# Patient Record
Sex: MALE | Race: WHITE
[De-identification: names, ages, dates, MRNs, and addresses within clinical notes are randomized per-mention and may not be internally consistent; named-entity substitution may affect disease eponyms.]

---

## 2018-01-11 ENCOUNTER — HOSPITAL ENCOUNTER (OUTPATIENT)
Dept: HOSPITAL 92 - LABBT | Age: 83
Discharge: HOME | End: 2018-01-11
Attending: THORACIC SURGERY (CARDIOTHORACIC VASCULAR SURGERY)
Payer: MEDICARE

## 2018-01-11 DIAGNOSIS — Z01.812: Primary | ICD-10-CM

## 2018-01-11 DIAGNOSIS — K55.059: ICD-10-CM

## 2018-01-11 DIAGNOSIS — Z88.2: ICD-10-CM

## 2018-01-11 DIAGNOSIS — Z88.0: ICD-10-CM

## 2018-01-11 LAB
ANION GAP SERPL CALC-SCNC: 9 MMOL/L (ref 10–20)
BUN SERPL-MCNC: 15 MG/DL (ref 8.4–25.7)
CALCIUM SERPL-MCNC: 9.7 MG/DL (ref 7.8–10.44)
CHLORIDE SERPL-SCNC: 104 MMOL/L (ref 98–107)
CO2 SERPL-SCNC: 32 MMOL/L (ref 23–31)
CREAT CL PREDICTED SERPL C-G-VRATE: 0 ML/MIN (ref 70–130)
GLUCOSE SERPL-MCNC: 105 MG/DL (ref 83–110)
HGB BLD-MCNC: 9.9 G/DL (ref 14–18)
MCH RBC QN AUTO: 35.2 PG (ref 27–31)
MCV RBC AUTO: 110 FL (ref 80–94)
PLATELET # BLD AUTO: 274 THOU/UL (ref 130–400)
POTASSIUM SERPL-SCNC: 4.5 MMOL/L (ref 3.5–5.1)
RBC # BLD AUTO: 2.82 MILL/UL (ref 4.7–6.1)
SODIUM SERPL-SCNC: 140 MMOL/L (ref 136–145)
WBC # BLD AUTO: 13.1 THOU/UL (ref 4.8–10.8)

## 2018-01-11 PROCEDURE — 85027 COMPLETE CBC AUTOMATED: CPT

## 2018-01-11 PROCEDURE — 80048 BASIC METABOLIC PNL TOTAL CA: CPT

## 2018-01-15 ENCOUNTER — HOSPITAL ENCOUNTER (OUTPATIENT)
Dept: HOSPITAL 92 - CCL | Age: 83
Discharge: HOME | End: 2018-01-15
Attending: THORACIC SURGERY (CARDIOTHORACIC VASCULAR SURGERY)
Payer: MEDICARE

## 2018-01-15 VITALS — BODY MASS INDEX: 22.1 KG/M2

## 2018-01-15 DIAGNOSIS — Z88.1: ICD-10-CM

## 2018-01-15 DIAGNOSIS — Z82.49: ICD-10-CM

## 2018-01-15 DIAGNOSIS — Z88.2: ICD-10-CM

## 2018-01-15 DIAGNOSIS — Z88.5: ICD-10-CM

## 2018-01-15 DIAGNOSIS — I10: ICD-10-CM

## 2018-01-15 DIAGNOSIS — I73.89: Primary | ICD-10-CM

## 2018-01-15 DIAGNOSIS — Z85.46: ICD-10-CM

## 2018-01-15 DIAGNOSIS — Z87.891: ICD-10-CM

## 2018-01-15 DIAGNOSIS — Z90.2: ICD-10-CM

## 2018-01-15 DIAGNOSIS — K55.1: ICD-10-CM

## 2018-01-15 DIAGNOSIS — Z88.0: ICD-10-CM

## 2018-01-15 PROCEDURE — 99153 MOD SED SAME PHYS/QHP EA: CPT

## 2018-01-15 PROCEDURE — C1769 GUIDE WIRE: HCPCS

## 2018-01-15 PROCEDURE — C1887 CATHETER, GUIDING: HCPCS

## 2018-01-15 PROCEDURE — 76942 ECHO GUIDE FOR BIOPSY: CPT

## 2018-01-15 PROCEDURE — B3001ZZ PLAIN RADIOGRAPHY OF THORACIC AORTA USING LOW OSMOLAR CONTRAST: ICD-10-PCS | Performed by: THORACIC SURGERY (CARDIOTHORACIC VASCULAR SURGERY)

## 2018-01-15 PROCEDURE — 99152 MOD SED SAME PHYS/QHP 5/>YRS: CPT

## 2018-01-15 PROCEDURE — 75625 CONTRAST EXAM ABDOMINL AORTA: CPT

## 2018-01-15 PROCEDURE — 36215 PLACE CATHETER IN ARTERY: CPT

## 2018-01-15 NOTE — OP
DATE OF PROCEDURE:  01/15/2018

 

PREOPERATIVE DIAGNOSIS:  Mesenteric ischemia - chronic.

 

POSTOPERATIVE DIAGNOSIS:  Mesenteric ischemia - chronic.

 

PROCEDURE PERFORMED:  Brachial approach to abdominal aortogram.

 

TOTAL CONTRAST:  30 mL

 

TOTAL FLUORO TIME:  16.3 minutes.

 

DESCRIPTION OF PROCEDURE:  After consent was obtained, the patient was brought to the cath lab and pl
aced in supine position on the cath lab table.  Left arm was prepped and draped in usual sterile fash
ion.  Arm was anesthetized with 1% lidocaine.  Using ultrasound guidance, the left brachial artery wa
s accessed with a micropuncture kit.  The 4-Bangladeshi micropuncture sheath was exchanged for a 5-Bangladeshi 
sheath.  Bentson wire and an angled glide catheter were then passed through the brachial and axillary
 artery into the thoracic aorta.  The catheter and wire preferentially traveled into the ascending ao
rta.  This was then exchanged for a Contra catheter and the Contra catheter were used to guide the Gl
idewire around the arch of the aorta into the descending thoracic aorta.  Multiple catheters and guid
ewires were then used to eventually traverse the thoracic aorta down into the abdominal aorta.  The l
ateral projection into the abdominal aorta was performed.  Celiac artery was noted to be patent, but 
highly stenotic and highly calcified at the origin from the aorta.  The SMA and REBEKAH were never visual
ized.  Multiple guidewires and catheters were then used to try to access the celiac artery.  I was ne
teja successful in accessing the orifice of the celiac artery.  Eventually, we abandoned all approache
s and catheters, guidewires and sheaths were removed and manual pressure held for hemostasis.  The pa
tient tolerated the procedure well.

## 2018-06-22 ENCOUNTER — HOSPITAL ENCOUNTER (OUTPATIENT)
Dept: HOSPITAL 92 - CT | Age: 83
Discharge: HOME | End: 2018-06-22
Attending: THORACIC SURGERY (CARDIOTHORACIC VASCULAR SURGERY)
Payer: MEDICARE

## 2018-06-22 DIAGNOSIS — J98.4: ICD-10-CM

## 2018-06-22 DIAGNOSIS — J43.9: ICD-10-CM

## 2018-06-22 DIAGNOSIS — I65.23: Primary | ICD-10-CM

## 2018-06-22 LAB — ESTIMATED GFR-MDRD - POC: (no result)

## 2018-06-22 PROCEDURE — 70498 CT ANGIOGRAPHY NECK: CPT

## 2018-06-22 PROCEDURE — 82565 ASSAY OF CREATININE: CPT

## 2018-06-22 NOTE — CT
CTA OF THE NECK UTILIZING iv CONTRAST AND 3d REFORMATTED IMAGING:

 

INDICATION: 

Symptomatic stenosis f the left carotid.

 

COMPARISON: 

CTA of the thorax dated 12/27/16.

 

FINDINGS: 

There is moderate to high-grade stenosis involving the origin and proximal aspect of the left common 
carotid artery.  There is a focus of moderate 50% stenosis involving the left mid common carotid erika
ry on image 108 of series 2.  There is a more focal area of severe stenosis involving the distal comm
on carotid artery image 123 of series 2.  This is involving approximately 90% of the luminal caliber 
of the artery.  There is moderate 50% lumen caliber involving the left carotid bulb with severe narro
wing of the origin of the left external carotid artery.  There is moderate 50% luminal caliber narrow
ing involving the proximal left internal carotid artery.  The remaining cervical course and visualize
d intracranial course of the left ICA appear within normal limits.  

 

The origin of the right common carotid artery from the right brachiocephalic artery appears patent.  
The right brachiocephalic artery origin and course are patent.  The right subclavian artery course ap
pears patent.

 

There is prominent calcification involving the right carotid bulb.  There is mild narrowing involving
 the origin of the right external carotid artery.  There is severe narrowing involving the proximal r
ight ICA causing 80% luminal caliber narrowing best seen on image 167 of series 2.  The remaining  ce
rvical course and visualized intracranial course appear patent.

 

There is severe narrowing along the origin of the right vertebral artery.  There is severe narrowing 
involving the origin of the left vertebral artery.  

 

There is moderate to severe narrowing involving the origin of the left subclavian artery from the aor
tic arch.  

 

There is pleural parenchymal scarring involving both lung apices with prominent emphysema.  There is 
a saber-sheath trachea.  There is slight increased prominence of the right azygous vein which may be 
related to some cardiac dysfunction.  No pathologically enlarged lymph nodes are seen within the neck
.  The visualized aerodigestive tract appears within normal limits.  No acute osseous abnormality kirstie
dent.

 

IMPRESSION: 

1.  High-grade stenosis involving the origin of the left common carotid artery and distal aspect of t
he left common carotid artery.  There is moderate narrowing involving the mid left common carotid art
marcio and left carotid bulb.  There is mild to moderate narrowing involving the proximal left internal 
carotid artery.

 

2.  High-grade stenosis involving the proximal right internal carotid artery with 80% luminal caliber
 narrowing.  There is high-grade stenosis involving the origins of both vertebral arteries.

 

3.  High-grade stenosis involving the origin of the left external carotid artery.

 

4.  Moderate to high-grade stenosis involving the origin of the left subclavian artery from the aorti
c arch.

 

5.  Emphysema and pleural parenchymal scarring.

 

POS: [facility]

## 2018-07-06 ENCOUNTER — HOSPITAL ENCOUNTER (INPATIENT)
Dept: HOSPITAL 92 - SURG A | Age: 83
LOS: 1 days | Discharge: HOME | DRG: 38 | End: 2018-07-07
Attending: THORACIC SURGERY (CARDIOTHORACIC VASCULAR SURGERY) | Admitting: THORACIC SURGERY (CARDIOTHORACIC VASCULAR SURGERY)
Payer: MEDICARE

## 2018-07-06 VITALS — SYSTOLIC BLOOD PRESSURE: 92 MMHG | DIASTOLIC BLOOD PRESSURE: 42 MMHG

## 2018-07-06 VITALS — BODY MASS INDEX: 23.8 KG/M2

## 2018-07-06 DIAGNOSIS — Z88.5: ICD-10-CM

## 2018-07-06 DIAGNOSIS — I10: ICD-10-CM

## 2018-07-06 DIAGNOSIS — Z85.46: ICD-10-CM

## 2018-07-06 DIAGNOSIS — Z86.69: ICD-10-CM

## 2018-07-06 DIAGNOSIS — J47.9: ICD-10-CM

## 2018-07-06 DIAGNOSIS — I65.22: Primary | ICD-10-CM

## 2018-07-06 DIAGNOSIS — Z88.0: ICD-10-CM

## 2018-07-06 DIAGNOSIS — I47.1: ICD-10-CM

## 2018-07-06 DIAGNOSIS — I73.89: ICD-10-CM

## 2018-07-06 DIAGNOSIS — Z90.2: ICD-10-CM

## 2018-07-06 DIAGNOSIS — Z86.718: ICD-10-CM

## 2018-07-06 DIAGNOSIS — Z87.891: ICD-10-CM

## 2018-07-06 DIAGNOSIS — Z88.1: ICD-10-CM

## 2018-07-06 LAB
ANION GAP SERPL CALC-SCNC: 11 MMOL/L (ref 10–20)
BUN SERPL-MCNC: 11 MG/DL (ref 8.4–25.7)
CALCIUM SERPL-MCNC: 9.4 MG/DL (ref 7.8–10.44)
CHLORIDE SERPL-SCNC: 99 MMOL/L (ref 98–107)
CO2 SERPL-SCNC: 33 MMOL/L (ref 23–31)
CREAT CL PREDICTED SERPL C-G-VRATE: 67 ML/MIN (ref 70–130)
GLUCOSE SERPL-MCNC: 98 MG/DL (ref 83–110)
HGB BLD-MCNC: 12.2 G/DL (ref 14–18)
MACROCYTES BLD QL SMEAR: (no result) (100X)
MCH RBC QN AUTO: 35.5 PG (ref 27–31)
MCV RBC AUTO: 109 FL (ref 78–98)
MDIFF COMPLETE?: YES
PLATELET # BLD AUTO: 197 THOU/UL (ref 130–400)
PLATELET BLD QL SMEAR: (no result)
POTASSIUM SERPL-SCNC: 4.1 MMOL/L (ref 3.5–5.1)
RBC # BLD AUTO: 3.43 MILL/UL (ref 4.7–6.1)
SODIUM SERPL-SCNC: 139 MMOL/L (ref 136–145)
WBC # BLD AUTO: 6.3 THOU/UL (ref 4.8–10.8)

## 2018-07-06 PROCEDURE — 85025 COMPLETE CBC W/AUTO DIFF WBC: CPT

## 2018-07-06 PROCEDURE — 94640 AIRWAY INHALATION TREATMENT: CPT

## 2018-07-06 PROCEDURE — 03CN0ZZ EXTIRPATION OF MATTER FROM LEFT EXTERNAL CAROTID ARTERY, OPEN APPROACH: ICD-10-PCS | Performed by: THORACIC SURGERY (CARDIOTHORACIC VASCULAR SURGERY)

## 2018-07-06 PROCEDURE — 94664 DEMO&/EVAL PT USE INHALER: CPT

## 2018-07-06 PROCEDURE — 36415 COLL VENOUS BLD VENIPUNCTURE: CPT

## 2018-07-06 PROCEDURE — 80048 BASIC METABOLIC PNL TOTAL CA: CPT

## 2018-07-06 RX ADMIN — MOMETASONE FUROATE AND FORMOTEROL FUMARATE DIHYDRATE SCH PUFF: 200; 5 AEROSOL RESPIRATORY (INHALATION) at 19:12

## 2018-07-06 NOTE — PRG
DATE OF SERVICE:  07/06/2018

 

This morning, status post carotid surgery.  He is doing well.  

 

PHYSICAL EXAMINATION: 

VITAL SIGNS:  Sats are 90% on supplemental oxygen, pulse 80, blood pressure 138/80, respirations 18. 
 He denies any difficulty breathing.

CHEST:  Chest reveals bilateral rhonchi and crackles.

CARDIAC:  Normal S1, S2.  No gallops.

ABDOMEN:  Soft, no masses.

 

IMPRESSION:

1.  Chronic bronchitis.  

2.  Bronchiectasis.

3.  Supraventricular tachycardia.  

4.  Carotid surgery.

 

PLAN:  Continue home medication.  If he is stable tomorrow he can be discharged home.  Follow up in t
he office.

## 2018-07-06 NOTE — OP
DATE OF PROCEDURE:  07/06/2018

 

PREOPERATIVE DIAGNOSIS:  Symptomatic left carotid stenosis.

 

POSTOPERATIVE DIAGNOSIS:  Symptomatic left carotid stenosis.

 

PROCEDURE:  Extended left carotid endarterectomy near the clavicle and extending all the way up throu
gh the bulb approximately 2 cm distal to the carotid bifurcation.  Note, a patch angioplasty was not 
performed due to the patient's chronic bronchiectasis and infection problems.  The patient was not ab
le to be shunted due to the length of the endarterectomy.

 

SURGEON:  Francisco Guerrero M.D.

 

ANESTHESIA:  General endotracheal.

 

ESTIMATED BLOOD LOSS:  Less than 100 mL.

 

PROCEDURE IN DETAIL:  After consent was obtained, the patient was brought to operating room and place
d supine on the operating room table.  Appropriate anesthetic monitor was placed and general endotrac
heal anesthesia induced.  Head was rotated to the right.  Left neck was prepped and draped in usual s
terile fashion.  Joints were appropriately supported and padded.

 

Skin incision was made along the anterior border of sternocleidomastoid.  This was later extended tila
nward towards the clavicle.  Dissection through the platysma was obtained with cautery.  Sternocleido
mastoid was mobilized.  Jugular vein was mobilized.  Facial vein branches _____  clips and ties.  Car
otid sheath was entered and the common external and internal carotid arteries were carefully exposed.
  A 5000 units of heparin was given.  A soft spot in the common carotid artery was able to be located
 very proximally.  The distal internal carotid artery was also soft.  The remainder of the carotid wa
s essentially calcified.  After 3 minutes, internal, common, and external carotid arteries were seria
lly clamped.  The incision was made on the common carotid artery extended through the near occlusion 
of the bulb and up into the internal carotid artery distal to plaque.  Endarterectomy was then perfor
med using good hemostats.  A good tapered distal endpoint was obtained.  Eversion endarterectomy of t
he external carotid artery was performed.  Medial fibers were debrided.  Artery was flushed with hepa
rinized saline.  The artery was closed with running 6-0 Prolene suture.  Prior to completion of the s
uture line, arteries were backbled and flushed with heparinized saline.  Suture line was completed an
d tied.  A 25 mg of protamine was administered.  Hemostasis was ensured.  Wounds were copiously irrig
ated, closed in layers.  Dermabond applied to the skin.  The patient was awakened and neurologically 
intact in the operating room.  The patient was then transferred to the intensive care unit in stable 
condition.  Needle, sponge, and instrument counts were all reported correct at the end of the procedu
re.

## 2018-07-07 VITALS — TEMPERATURE: 100 F

## 2018-07-07 RX ADMIN — MOMETASONE FUROATE AND FORMOTEROL FUMARATE DIHYDRATE SCH: 200; 5 AEROSOL RESPIRATORY (INHALATION) at 10:19

## 2018-07-07 RX ADMIN — MOMETASONE FUROATE AND FORMOTEROL FUMARATE DIHYDRATE SCH PUFF: 200; 5 AEROSOL RESPIRATORY (INHALATION) at 11:37

## 2018-07-07 NOTE — PRG
DATE OF SERVICE:  07/07/2018

 

SUBJECTIVE:  Mr. Jimenez is in no distress.  He has no complaints.  He feels like he is ready to go home
.  He is on nitroglycerin drip.  His blood pressure is 176/85.  At this time, he is getting his morni
ng dose of Cardizem 120 mg.

 

Overnight, his blood pressure has been reasonably controlled in 160s although _____ after surgery to 
be better than yesterday afternoon, he was in 130s-140s.  Suspect once he starts absorbing his Cardiz
em, his blood pressure will be better and will be off the nitroglycerin.

 

PHYSICAL EXAMINATION:

LUNGS:  Remarkable for crackles at both lung bases.

CARDIOVASCULAR:  Regular rhythm.

ABDOMEN:  Soft and nontender.

EXTREMITIES:  Without asymmetry.  He has no focal neurological complaints or deficits.

 

LABORATORY DATA:  White count 6.3 yesterday, hemoglobin 12.2.  Electrolytes were normal yesterday.

 

IMPRESSION:

1.  Status post carotid endarterectomy.

2.  Underlying chronic bronchitis.

3.  Underlying bronchiectasis.

4.  Hypertension.  Hopefully, this will improve with his Cardizem, I suspect it will.  It might do be
tter with a higher dose of Cardizem twice a day since he says his blood pressure is up and down at ho
me.

## 2018-07-09 NOTE — DIS
Mr. Jimenez was brought in for elective left carotid endarterectomy.  He underwent an extended endartere
ctomy beginning down at the level of the clavicle extending through the bulb onto the internal caroti
d artery, approximately 2 cm distal from the bulb.  There was extensive plaque that was removed and c
leared.  We did not do patch angioplasty due to the long nature of the endarterectomy and inability t
o shunt during the procedure.  Postoperatively, he was neurologically intact.  He did well and was di
scharged home the following morning on the same medications he was admitted with.

## 2019-01-04 ENCOUNTER — HOSPITAL ENCOUNTER (OUTPATIENT)
Dept: HOSPITAL 92 - SCSMRI | Age: 84
Discharge: HOME | End: 2019-01-04
Attending: ANESTHESIOLOGY
Payer: MEDICARE

## 2019-01-04 DIAGNOSIS — M47.22: Primary | ICD-10-CM

## 2019-01-04 DIAGNOSIS — M48.02: ICD-10-CM

## 2019-01-04 PROCEDURE — 72141 MRI NECK SPINE W/O DYE: CPT

## 2019-01-04 NOTE — MRI
CERVICAL SPINE MRI WITHOUT CONTRAST:

1/4/19

 

HISTORY: 

Chronic neck pain. 

 

COMPARISON:  

None.

 

TECHNIQUE:  

MRI cervical spine is performed without intravenous gadolinium administration. Multisequential, multi
planar imaging is performed. 

 

FINDINGS:  

There is 2 mm of anterolisthesis of C2 upon C3. There is reversal of normal cervical kyphosis startin
g at the C5 level. There is appropriate T1 marrow signal intensity of the cervical vertebrae. No evid
ence of fracture. No significant STIR hyperintensity to suggest vertebral body edema or ligamentous i
njury. The visualized brain parenchyma, cervicomedullary junction, cervical cord, and the upper thora
cic cord have a normal size and signal intensity. 

 

C2-C3: There is a central/left paracentral disc protrusion. Minimal mass  effect upon the left hemico
rd. Mild central canal stenosis. Right neural foramen is patent. Mild left foraminal narrowing.

 

C3-C4: Severe loss of disc space height. Generalized disc osteophyte complex effaces the ventral suba
rachnoid space. There is deformity upon the central and left paracentral cord, without T2 hyperintens
ity in the cord. Moderate central canal stenosis. Severe right and moderate to severe left foraminal 
narrowing. 

 

C4-C5: Severe loss of disc space height. Generalized disc osteophyte complex abuts the thecal sac. Ve
ntral subarachnoid space is nearly effaced. There is deformity of the cervical spine without T2 hyper
intensity of the cord. Moderate central canal stenosis. Moderate bilateral foraminal narrowing. 

 

C5-C6: Broad based osteophyte complex abuts the thecal sac. Subarachnoid space is effaced. Moderate t
o severe central canal stenosis. There is deformity of the cervical cord, without T2 hyperintensity o
f the cord. Moderate bilateral foraminal narrowing. 

 

C6-C7: There is a broad based disc osteophyte complex that abuts the thecal sac. There is moderate ce
ntral canal stenosis. Mild to moderate right and moderate left foraminal narrowing. 

 

C7-T1: There is severe loss of disc space height. There is a central/left paracentral disc protrusion
. Moderate central canal stenosis. Mild bilateral foraminal narrowing. 

 

IMPRESSION:  

Degenerative changes  of the cervical spine as above. There is significant multilevel neural foramina
l narrowing and central canal stenosis, on the basis of degenerative change. 

 

POS: [facility]

## 2019-02-25 ENCOUNTER — HOSPITAL ENCOUNTER (EMERGENCY)
Dept: HOSPITAL 92 - ERS | Age: 84
Discharge: HOME | End: 2019-02-25
Payer: MEDICARE

## 2019-02-25 DIAGNOSIS — F32.9: ICD-10-CM

## 2019-02-25 DIAGNOSIS — Z79.51: ICD-10-CM

## 2019-02-25 DIAGNOSIS — Z86.73: ICD-10-CM

## 2019-02-25 DIAGNOSIS — I10: ICD-10-CM

## 2019-02-25 DIAGNOSIS — Z87.891: ICD-10-CM

## 2019-02-25 DIAGNOSIS — J44.9: ICD-10-CM

## 2019-02-25 DIAGNOSIS — I48.92: Primary | ICD-10-CM

## 2019-02-25 DIAGNOSIS — Z79.899: ICD-10-CM

## 2019-02-25 LAB
ALBUMIN SERPL BCG-MCNC: 3.5 G/DL (ref 3.4–4.8)
ALP SERPL-CCNC: 49 U/L (ref 40–150)
ALT SERPL W P-5'-P-CCNC: 11 U/L (ref 8–55)
ANION GAP SERPL CALC-SCNC: 11 MMOL/L (ref 10–20)
AST SERPL-CCNC: 17 U/L (ref 5–34)
BASOPHILS # BLD AUTO: 0 THOU/UL (ref 0–0.2)
BASOPHILS NFR BLD AUTO: 0.2 % (ref 0–1)
BILIRUB SERPL-MCNC: 0.2 MG/DL (ref 0.2–1.2)
BUN SERPL-MCNC: 22 MG/DL (ref 8.4–25.7)
CALCIUM SERPL-MCNC: 9.4 MG/DL (ref 7.8–10.44)
CHLORIDE SERPL-SCNC: 103 MMOL/L (ref 98–107)
CO2 SERPL-SCNC: 31 MMOL/L (ref 23–31)
CREAT CL PREDICTED SERPL C-G-VRATE: 0 ML/MIN (ref 70–130)
EOSINOPHIL # BLD AUTO: 0.1 THOU/UL (ref 0–0.7)
EOSINOPHIL NFR BLD AUTO: 0.9 % (ref 0–10)
GLOBULIN SER CALC-MCNC: 2.8 G/DL (ref 2.4–3.5)
GLUCOSE SERPL-MCNC: 96 MG/DL (ref 83–110)
HGB BLD-MCNC: 12 G/DL (ref 14–18)
LYMPHOCYTES # BLD: 0.5 THOU/UL (ref 1.2–3.4)
LYMPHOCYTES NFR BLD AUTO: 4 % (ref 21–51)
MACROCYTES BLD QL SMEAR: (no result) (100X)
MCH RBC QN AUTO: 36.1 PG (ref 27–31)
MCV RBC AUTO: 116 FL (ref 78–98)
MDIFF COMPLETE?: YES
MONOCYTES # BLD AUTO: 1 THOU/UL (ref 0.11–0.59)
MONOCYTES NFR BLD AUTO: 8.9 % (ref 0–10)
NEUTROPHILS # BLD AUTO: 10 THOU/UL (ref 1.4–6.5)
NEUTROPHILS NFR BLD AUTO: 86 % (ref 42–75)
PLATELET # BLD AUTO: 154 THOU/UL (ref 130–400)
POLYCHROMASIA BLD QL SMEAR: (no result) (100X)
POTASSIUM SERPL-SCNC: 3.9 MMOL/L (ref 3.5–5.1)
RBC # BLD AUTO: 3.31 MILL/UL (ref 4.7–6.1)
SODIUM SERPL-SCNC: 141 MMOL/L (ref 136–145)
WBC # BLD AUTO: 11.6 THOU/UL (ref 4.8–10.8)

## 2019-02-25 PROCEDURE — 84484 ASSAY OF TROPONIN QUANT: CPT

## 2019-02-25 PROCEDURE — 71045 X-RAY EXAM CHEST 1 VIEW: CPT

## 2019-02-25 PROCEDURE — 85025 COMPLETE CBC W/AUTO DIFF WBC: CPT

## 2019-02-25 PROCEDURE — 83880 ASSAY OF NATRIURETIC PEPTIDE: CPT

## 2019-02-25 PROCEDURE — 80053 COMPREHEN METABOLIC PANEL: CPT

## 2019-02-25 PROCEDURE — 93005 ELECTROCARDIOGRAM TRACING: CPT

## 2019-02-25 PROCEDURE — 36415 COLL VENOUS BLD VENIPUNCTURE: CPT

## 2019-02-25 NOTE — RAD
CHEST 1 VIEW:

 

Date:  02/25/19 

 

HISTORY:  

Shortness of breath. 

 

COMPARISON:  

Radiograph dated 03/16/17. 

 

FINDINGS:

There is elevation of the right hemidiaphragm, chronic. There is scarring in both lower lobes. 

 

No pneumothorax. Dense calcification transverse aorta. There is scarring  and encapsulation of the bi
lateral lung apices, which gives the appearance of pneumothorax, although is not real as there is sca
rring and extrapleural fat taking the places of the pleural space in the upper lobes bilaterally. 

 

IMPRESSION: 

Chronic findings. No acute intrathoracic abnormality. 

 

 

POS: [facility]

## 2019-04-10 ENCOUNTER — HOSPITAL ENCOUNTER (OUTPATIENT)
Dept: HOSPITAL 92 - SDC | Age: 84
Discharge: HOME | End: 2019-04-10
Attending: INTERNAL MEDICINE
Payer: MEDICARE

## 2019-04-10 VITALS — BODY MASS INDEX: 22.8 KG/M2

## 2019-04-10 DIAGNOSIS — K31.9: ICD-10-CM

## 2019-04-10 DIAGNOSIS — I48.91: ICD-10-CM

## 2019-04-10 DIAGNOSIS — K25.9: ICD-10-CM

## 2019-04-10 DIAGNOSIS — J44.9: ICD-10-CM

## 2019-04-10 DIAGNOSIS — R13.19: Primary | ICD-10-CM

## 2019-04-10 DIAGNOSIS — K44.9: ICD-10-CM

## 2019-04-10 PROCEDURE — 88312 SPECIAL STAINS GROUP 1: CPT

## 2019-04-10 PROCEDURE — 0D758ZZ DILATION OF ESOPHAGUS, VIA NATURAL OR ARTIFICIAL OPENING ENDOSCOPIC: ICD-10-PCS | Performed by: INTERNAL MEDICINE

## 2019-04-10 PROCEDURE — 88305 TISSUE EXAM BY PATHOLOGIST: CPT

## 2019-04-10 PROCEDURE — 0DB58ZX EXCISION OF ESOPHAGUS, VIA NATURAL OR ARTIFICIAL OPENING ENDOSCOPIC, DIAGNOSTIC: ICD-10-PCS | Performed by: INTERNAL MEDICINE

## 2019-04-10 NOTE — OP
DATE OF PROCEDURE:  04/10/2019



PROCEDURES PERFORMED:  Esophagogastroduodenoscopy with biopsy and esophageal

dilation over guidewire. 



PREOPERATIVE DIAGNOSIS:  Esophageal dysphagia.



DESCRIPTION OF PROCEDURE:  Informed consent was obtained from the patient.  He was

sedated with total intravenous anesthesia.  The bite block was placed and the

endoscope was advanced easily to the second portion of the duodenum and retroflexion

was performed in the stomach.  The esophagus was normal overall.  There is no focal

stricture.  I passed an 18 mm Savary dilator over a guidewire throughout the

esophagus without any change on second look endoscopy.  There was a 5 cm hiatal

hernia present.  There was an 8 mm thin narrow ulcer in the body of the stomach.

The biopsies of the ulcer were biopsied; however, this does not appear to have an

malignant appearance.  The remainder of the gastric mucosa was unremarkable.  The

pylorus and first and second portions of the duodenum were normal. 



IMPRESSION:  

1. A 5 cm hiatal hernia.

2. An 8 mm narrow ulcer in the body of the stomach.  The edges of the ulcers were

biopsied; however, this does not have a obvious malignant appearance. 

3. The esophagus overall appeared unremarkable.  I passed an 18 mm Savary dilator

through the esophagus with no change on second look endoscopy. 



RECOMMENDATIONS:  

1. Continue proton pump inhibitor.

2. Follow up in GI clinic to evaluate response to the esophageal dilation.

3. Await histopathology.

4. Avoid NSAIDs.

5. He should be able to restart his Eliquis today.







Job ID:  [id number]

## 2019-07-08 ENCOUNTER — HOSPITAL ENCOUNTER (OUTPATIENT)
Dept: HOSPITAL 92 - RAD | Age: 84
Discharge: HOME | End: 2019-07-08
Attending: INTERNAL MEDICINE
Payer: MEDICARE

## 2019-07-08 DIAGNOSIS — J98.4: ICD-10-CM

## 2019-07-08 DIAGNOSIS — R06.00: Primary | ICD-10-CM

## 2019-07-08 PROCEDURE — 71046 X-RAY EXAM CHEST 2 VIEWS: CPT

## 2019-07-08 NOTE — RAD
2 VIEWS CHEST:

 

Date:  07/08/19

 

COMPARISON:  

10/25/16. 02/25/19. 

 

HISTORY: 

Dyspnea. 

 

FINDINGS:

2 views of the chest show a cardiomediastinal silhouette which is upper limits of normal in size with
 atherosclerotic calcifications in the aorta. Diffuse increased interstitial lung markings are presen
t. There is stable elevation of the right hemidiaphragm. Biapical pleural thickening is stable. 

 

IMPRESSION: 

Stable chronic lung disease. 

 

 

POS: [facility]

## 2019-07-09 ENCOUNTER — HOSPITAL ENCOUNTER (OUTPATIENT)
Dept: HOSPITAL 92 - SCSMRI | Age: 84
Discharge: HOME | End: 2019-07-09
Attending: ANESTHESIOLOGY
Payer: MEDICARE

## 2019-07-09 DIAGNOSIS — M51.16: ICD-10-CM

## 2019-07-09 DIAGNOSIS — M48.062: Primary | ICD-10-CM

## 2019-07-09 PROCEDURE — 72148 MRI LUMBAR SPINE W/O DYE: CPT

## 2019-07-09 NOTE — MRI
MRI LUMBAR SPINE WITHOUT CONTRAST:

 

INDICATIONS:

Lumbar radiculopathy.  Lumbar stenosis with neurogenic claudication.

 

TECHNIQUE:

Multiplanar, multisequential imaging of the lumbar spine obtained.

 

FINDINGS:

There is anterior wedging of the T12 vertebra with loss of anterior height of 60% to 70%.  No posteri
or retropulsion.  Posterior height is maintained.  No edema is seen at this vertebral body, indicatin
g a stable compression deformity.

 

Lumbar vertebrae maintain height.  There is a scoliotic curvature with convexity to the right with ap
ex at L2-L3.  Degenerative spurring from all lumbar vertebrae.  Loss of disk space at all lumbar leve
ls.  Slight posterior listhesis at the L1-L2, L2-L3, and L3-L4 levels.

 

Anterior wedging of the T12 vertebra with loss of anterior height of 50% to 60%.  No edema present, i
ndicating a chronic stable compression deformity.  Minimal posterior retropulsion of the posterior-bay
perior corner of T12.

 

At the T11-T12 disk level, slight disk bulge, associated with minimal posterior retropulsion of the p
osterior-superior corner of T12, is seen.  These changes flatten the thecal sac but do not significan
tly impinge on the conus.

 

No significant disk bulge at T12-L1.

 

At L1-L2, there is slight posterior listhesis with diffuse disk bulge flattening the thecal sac.  Fac
et and ligamentous hypertrophy.  Mild central canal stenosis.  Left foraminal narrowing due to disk b
ulge and hypertrophic change.

 

At L2-L3, diffuse disk bulge, compatible with hypertrophic change, results in mild central canal sten
osis.  Left foraminal encroachment due to disk osteophyte complex.

 

At L3-L4, slight posterolisthesis is present with diffuse disk bulge flattening the thecal sac.  Face
t and ligamentous hypertrophy.  Mild to moderate central canal stenosis at this level.  Right foramin
al stenosis, exacerbated by the scoliotic curvature.

 

At L4-L5, mild disk bulge.  Facet and ligamentous hypertrophy.  Mild central canal stenosis.  Bilater
al foraminal narrowing due to facet hypertrophy.  Asymmetric disk osteophyte complex is seen to the l
eft.

 

At L5-S1, broad-based disk bulge abuts the thecal sac.  No significant central canal stenosis.  Mild 
right foraminal encroachment due to disk osteophyte complex and facet hypertrophy.

 

There appears to be an exophytic cyst from the superior pole, left kidney, inadequately evaluated.  T
here are other renal cystic lesions bilaterally.  This large exophytic cyst from the superior left ki
dney appears to have increased in size when compared to a CT of 02/28/2017.

 

IMPRESSION:

Multilevel degenerative disk changes at the lumbar spine with areas of central canal and foraminal st
enosis, as described above.

 

POS: [facility]

## 2019-07-11 ENCOUNTER — HOSPITAL ENCOUNTER (INPATIENT)
Dept: HOSPITAL 92 - ERS | Age: 84
LOS: 12 days | Discharge: HOSPICE | DRG: 189 | End: 2019-07-23
Attending: INTERNAL MEDICINE | Admitting: INTERNAL MEDICINE
Payer: MEDICARE

## 2019-07-11 VITALS — BODY MASS INDEX: 20.5 KG/M2

## 2019-07-11 DIAGNOSIS — B37.0: ICD-10-CM

## 2019-07-11 DIAGNOSIS — Z90.2: ICD-10-CM

## 2019-07-11 DIAGNOSIS — I10: ICD-10-CM

## 2019-07-11 DIAGNOSIS — K13.21: ICD-10-CM

## 2019-07-11 DIAGNOSIS — F32.9: ICD-10-CM

## 2019-07-11 DIAGNOSIS — J44.1: ICD-10-CM

## 2019-07-11 DIAGNOSIS — Z88.2: ICD-10-CM

## 2019-07-11 DIAGNOSIS — Z98.42: ICD-10-CM

## 2019-07-11 DIAGNOSIS — Z98.41: ICD-10-CM

## 2019-07-11 DIAGNOSIS — I48.91: ICD-10-CM

## 2019-07-11 DIAGNOSIS — Z87.891: ICD-10-CM

## 2019-07-11 DIAGNOSIS — Z79.01: ICD-10-CM

## 2019-07-11 DIAGNOSIS — Z66: ICD-10-CM

## 2019-07-11 DIAGNOSIS — J96.21: Primary | ICD-10-CM

## 2019-07-11 DIAGNOSIS — Z88.0: ICD-10-CM

## 2019-07-11 DIAGNOSIS — I47.1: ICD-10-CM

## 2019-07-11 DIAGNOSIS — I77.4: ICD-10-CM

## 2019-07-11 DIAGNOSIS — Z86.711: ICD-10-CM

## 2019-07-11 DIAGNOSIS — D64.9: ICD-10-CM

## 2019-07-11 DIAGNOSIS — F41.9: ICD-10-CM

## 2019-07-11 DIAGNOSIS — Z88.5: ICD-10-CM

## 2019-07-11 DIAGNOSIS — Z88.8: ICD-10-CM

## 2019-07-11 DIAGNOSIS — M19.90: ICD-10-CM

## 2019-07-11 DIAGNOSIS — Z51.5: ICD-10-CM

## 2019-07-11 DIAGNOSIS — Z85.46: ICD-10-CM

## 2019-07-11 DIAGNOSIS — E78.5: ICD-10-CM

## 2019-07-11 DIAGNOSIS — K55.1: ICD-10-CM

## 2019-07-11 DIAGNOSIS — Z90.49: ICD-10-CM

## 2019-07-11 DIAGNOSIS — K21.9: ICD-10-CM

## 2019-07-11 DIAGNOSIS — Z86.73: ICD-10-CM

## 2019-07-11 LAB
ALBUMIN SERPL BCG-MCNC: 3.6 G/DL (ref 3.4–4.8)
ALP SERPL-CCNC: 57 U/L (ref 40–150)
ALT SERPL W P-5'-P-CCNC: 12 U/L (ref 8–55)
ANION GAP SERPL CALC-SCNC: 12 MMOL/L (ref 10–20)
AST SERPL-CCNC: 12 U/L (ref 5–34)
BASOPHILS # BLD AUTO: 0 THOU/UL (ref 0–0.2)
BASOPHILS NFR BLD AUTO: 0 % (ref 0–1)
BILIRUB SERPL-MCNC: 0.2 MG/DL (ref 0.2–1.2)
BUN SERPL-MCNC: 22 MG/DL (ref 8.4–25.7)
CALCIUM SERPL-MCNC: 9.7 MG/DL (ref 7.8–10.44)
CHLORIDE SERPL-SCNC: 95 MMOL/L (ref 98–107)
CO2 SERPL-SCNC: 34 MMOL/L (ref 23–31)
CREAT CL PREDICTED SERPL C-G-VRATE: 0 ML/MIN (ref 70–130)
EOSINOPHIL # BLD AUTO: 0 THOU/UL (ref 0–0.7)
EOSINOPHIL NFR BLD AUTO: 0.2 % (ref 0–10)
GLOBULIN SER CALC-MCNC: 3.6 G/DL (ref 2.4–3.5)
GLUCOSE SERPL-MCNC: 115 MG/DL (ref 83–110)
HGB BLD-MCNC: 12 G/DL (ref 14–18)
LYMPHOCYTES # BLD: 0.3 THOU/UL (ref 1.2–3.4)
LYMPHOCYTES NFR BLD AUTO: 2.3 % (ref 21–51)
MACROCYTES BLD QL SMEAR: (no result) (100X)
MCH RBC QN AUTO: 35.7 PG (ref 27–31)
MCV RBC AUTO: 114 FL (ref 78–98)
MDIFF COMPLETE?: YES
MONOCYTES # BLD AUTO: 0.8 THOU/UL (ref 0.11–0.59)
MONOCYTES NFR BLD AUTO: 7.1 % (ref 0–10)
NEUTROPHILS # BLD AUTO: 10.6 THOU/UL (ref 1.4–6.5)
NEUTROPHILS NFR BLD AUTO: 90.4 % (ref 42–75)
PLATELET # BLD AUTO: 195 THOU/UL (ref 130–400)
POTASSIUM SERPL-SCNC: 5.1 MMOL/L (ref 3.5–5.1)
RBC # BLD AUTO: 3.35 MILL/UL (ref 4.7–6.1)
SODIUM SERPL-SCNC: 136 MMOL/L (ref 136–145)
WBC # BLD AUTO: 11.7 THOU/UL (ref 4.8–10.8)

## 2019-07-11 PROCEDURE — 83735 ASSAY OF MAGNESIUM: CPT

## 2019-07-11 PROCEDURE — 96375 TX/PRO/DX INJ NEW DRUG ADDON: CPT

## 2019-07-11 PROCEDURE — 85025 COMPLETE CBC W/AUTO DIFF WBC: CPT

## 2019-07-11 PROCEDURE — 96365 THER/PROPH/DIAG IV INF INIT: CPT

## 2019-07-11 PROCEDURE — 94664 DEMO&/EVAL PT USE INHALER: CPT

## 2019-07-11 PROCEDURE — 87070 CULTURE OTHR SPECIMN AEROBIC: CPT

## 2019-07-11 PROCEDURE — 83880 ASSAY OF NATRIURETIC PEPTIDE: CPT

## 2019-07-11 PROCEDURE — 71045 X-RAY EXAM CHEST 1 VIEW: CPT

## 2019-07-11 PROCEDURE — 96367 TX/PROPH/DG ADDL SEQ IV INF: CPT

## 2019-07-11 PROCEDURE — 36415 COLL VENOUS BLD VENIPUNCTURE: CPT

## 2019-07-11 PROCEDURE — 93010 ELECTROCARDIOGRAM REPORT: CPT

## 2019-07-11 PROCEDURE — 94668 MNPJ CHEST WALL SBSQ: CPT

## 2019-07-11 PROCEDURE — 94667 MNPJ CHEST WALL 1ST: CPT

## 2019-07-11 PROCEDURE — 72148 MRI LUMBAR SPINE W/O DYE: CPT

## 2019-07-11 PROCEDURE — 70450 CT HEAD/BRAIN W/O DYE: CPT

## 2019-07-11 PROCEDURE — 74018 RADEX ABDOMEN 1 VIEW: CPT

## 2019-07-11 PROCEDURE — 84484 ASSAY OF TROPONIN QUANT: CPT

## 2019-07-11 PROCEDURE — 71046 X-RAY EXAM CHEST 2 VIEWS: CPT

## 2019-07-11 PROCEDURE — 80053 COMPREHEN METABOLIC PANEL: CPT

## 2019-07-11 PROCEDURE — 85379 FIBRIN DEGRADATION QUANT: CPT

## 2019-07-11 PROCEDURE — 80048 BASIC METABOLIC PNL TOTAL CA: CPT

## 2019-07-11 PROCEDURE — 93005 ELECTROCARDIOGRAM TRACING: CPT

## 2019-07-11 PROCEDURE — 87205 SMEAR GRAM STAIN: CPT

## 2019-07-11 PROCEDURE — 94640 AIRWAY INHALATION TREATMENT: CPT

## 2019-07-11 NOTE — RAD
PORTABLE CHEST ONE VIEW:

7/11/19 at 10:11 p.m.

 

HISTORY: 

Shortness of breath. 

 

FINDINGS: 

Comparison made with exam of 7/8/19.

 

The heart is enlarged. The aorta is tortuous. Chronic changes are again seen bilaterally. No lobar co
nsolidation, pneumothoraces, radha pulmonary edema or large effusions are seen. 

 

There is continued elevation of the right hemidiaphragm. 

 

IMPRESSION: 

No acute process. 

 

POS: [facility]

## 2019-07-12 LAB
ANION GAP SERPL CALC-SCNC: 11 MMOL/L (ref 10–20)
BASOPHILS # BLD AUTO: 0.1 THOU/UL (ref 0–0.2)
BASOPHILS NFR BLD AUTO: 0.8 % (ref 0–1)
BUN SERPL-MCNC: 19 MG/DL (ref 8.4–25.7)
CALCIUM SERPL-MCNC: 9.9 MG/DL (ref 7.8–10.44)
CHLORIDE SERPL-SCNC: 95 MMOL/L (ref 98–107)
CO2 SERPL-SCNC: 33 MMOL/L (ref 23–31)
CREAT CL PREDICTED SERPL C-G-VRATE: 0 ML/MIN (ref 70–130)
EOSINOPHIL # BLD AUTO: 0 THOU/UL (ref 0–0.7)
EOSINOPHIL NFR BLD AUTO: 0.1 % (ref 0–10)
GLUCOSE SERPL-MCNC: 135 MG/DL (ref 83–110)
HGB BLD-MCNC: 12.5 G/DL (ref 14–18)
LYMPHOCYTES # BLD: 0.3 THOU/UL (ref 1.2–3.4)
LYMPHOCYTES NFR BLD AUTO: 3.2 % (ref 21–51)
MCH RBC QN AUTO: 35.9 PG (ref 27–31)
MCV RBC AUTO: 114 FL (ref 78–98)
MONOCYTES # BLD AUTO: 0.1 THOU/UL (ref 0.11–0.59)
MONOCYTES NFR BLD AUTO: 0.5 % (ref 0–10)
NEUTROPHILS # BLD AUTO: 9.7 THOU/UL (ref 1.4–6.5)
NEUTROPHILS NFR BLD AUTO: 95.4 % (ref 42–75)
PLATELET # BLD AUTO: 198 THOU/UL (ref 130–400)
POTASSIUM SERPL-SCNC: 4.7 MMOL/L (ref 3.5–5.1)
RBC # BLD AUTO: 3.49 MILL/UL (ref 4.7–6.1)
SODIUM SERPL-SCNC: 134 MMOL/L (ref 136–145)
WBC # BLD AUTO: 10.2 THOU/UL (ref 4.8–10.8)

## 2019-07-12 RX ADMIN — MOMETASONE FUROATE AND FORMOTEROL FUMARATE DIHYDRATE SCH PUFF: 200; 5 AEROSOL RESPIRATORY (INHALATION) at 20:51

## 2019-07-12 RX ADMIN — CEFTRIAXONE SCH MLS: 1 INJECTION, POWDER, FOR SOLUTION INTRAMUSCULAR; INTRAVENOUS at 23:52

## 2019-07-12 NOTE — HP
CHIEF COMPLAINT:  Shortness of breath and cough.



HISTORY OF PRESENT ILLNESS:  This patient is an 88-year-old male with a history 
of

significant COPD, followed by Dr. Marr.  The patient reports that he had 
recently

had increasing shortness of breath with exertion and a cough productive of

discolored sputum.  He presented to Dr. Marr and was started on p.o. 
antibiotics and

prednisone.  The patient reports that on this particular occasion, his symptoms 
did

not improve and actually seemed to progress.  When he got to the point that he 
was

feeling short of breath even at rest, he decided to present to the emergency

department.  He has been using his usual home bronchodilators without 
substantial

improvement.  The patient reports that the primary trigger for him today is 
that he

had some company and they stayed for a couple hours and simply trying to talk to

them made him extremely short of breath and prompted him to come seek more

aggressive medical attention. 



REVIEW OF SYSTEMS:  He denies any chest pain, fevers, or chills.  He does 
report a

sore area on his right tongue, which he believes has been present for longer 
than he

would think would be normal.  Reports his chest feels a little bit tight and 
sore

from coughing and he does indicate that today he had a pretty profuse sweats 
when he

called for an ambulance.  Other than that, all systems were reviewed, all 
pertinent

positives and negatives noted in history of present illness. 



PAST MEDICAL HISTORY:  Notable for prior CVA, prostate cancer in 2003, GERD,

hypertension, dyslipidemia, COPD, bronchiectasis, SVT status post failed 
ablation,

osteoarthritis.  He also reports that he had an episode of intestinal angina but

states that the physician in [place] did some type of radial artery procedure 
that

resolved it. 



PAST SURGICAL HISTORY:  Right lower lobe partial lobectomy secondary to 
pulmonary

hemorrhage, appendectomy, herniorrhaphy, bilateral cataract ectomy, carpal 
tunnel

release on the left and the above-mentioned cardiac ablation procedure.  He also

states he is currently receiving back injections from Dr. Chappell.  The 
patient

had a left carotid endarterectomy with extensive cleanout in July 2018. 



PSYCH HISTORY:  The patient does admit to some anxiety and depression, but 
believes

it is not too severe. 



SOCIAL HISTORY:  The patient is .  He ambulates generally well, but will

occasionally use a walker if he has some vertigo.  He is a nondrinker, nondrug 
user.

 He quit smoking as a new year's resolution in 1966.  He is a DNAR and his 
daughter

is his surrogate decision maker should that become necessary. 



ALLERGIES:  MEPERIDINE, CODEINE, PENICILLIN, SULFAMETHOXAZOLE, TRIMETHOPRIM.



CURRENT MEDICATIONS:  

1. Albuterol 2 puffs t.i.d. p.r.n.

2. Folic acid 1 mg daily.

3. Pramipexole 1 mg q.i.d.

4. Zolpidem 5 mg at bedtime p.r.n. insomnia.

5. Prednisone 5 mg daily.

6. Pantoprazole 40 mg one p.o. daily 30 minutes before breakfast.

7. Acetaminophen 325 mg daily p.r.n.

8. Tramadol 50 mg t.i.d.

9. Multaq 400 mg b.i.d.

10. Eliquis 5 mg daily.

11. Simvastatin 5 mg daily.

12. Loratadine 10 mg p.r.n.

13. Guaifenesin 800 mg t.i.d.

14. Flunisolide 25 mcg two sprays per nostril daily.

15. Budesonide 0.25 mg inhaled b.i.d.

16. Tamsulosin 0.4 mg daily.

17. Diltiazem 120 mg p.o. daily.

18. Potassium 20 mEq p.o. daily.



PHYSICAL EXAMINATION:

VITAL SIGNS:  /70, pulse 94, respirations 16, temperature is 98, O2 
saturation

94% on 2 L nasal cannula. 

GENERAL APPEARANCE:  Age-appropriate male.  He is in no significant distress

presently, says he is feeling some better.  He is wearing nasal cannula oxygen. 

HEENT:  ERIC.  No OP lesions.  The patient does have an area that is 
approximately 1

cm x 0.5 mm on the lateral right mid tongue that has a pale white plaque area. 

NECK:  Supple and symmetric.  No lymphadenopathy, JVD, or carotid bruits. 

HEART:  Regular rate and rhythm without murmurs, gallops, or rubs. 

LUNGS:  Have harsh rales scattered throughout all lung fields, but more 
concentrated

in the left base. 

ABDOMEN:  Slightly protuberant, but soft, nontender, and nondistended.  Positive

bowel sounds.  No masses.  No organomegaly. 

EXTREMITIES:  No cyanosis, clubbing, or edema.  He has diminished pulses in the

lower extremities. 

PSYCH:  The patient is actually quite, pleasant with normal affect and 
behavior. 

NEURO:  The patient moves all extremities spontaneously.  His cognition is 
normal.



LABORATORY DATA:  White count 11.7, hemoglobin 12.0, platelets 195, MCV is 
114.0.

D-dimer was 0.67.  Sodium 136, potassium 5.1, chloride 95, CO2 of 34, BUN 22,

creatinine is 1.0, GFR 71, glucose 115, AST is 12, ALT 12, alkaline phosphatase 
57.

BNP 44.7.  Chest x-ray, heart was enlarged.  The aorta was tortuous.  Chronic

changes seen bilaterally with no lobar consolidations or pulmonary edema.  
There is

elevation of the right hemidiaphragm.  EKG shows sinus rhythm at 88 beats per 
minute

with some PACs. 



IMPRESSION AND PLAN:  

1. Chronic obstructive pulmonary disease exacerbation.  The patient also has 
some

reported history of bronchiectasis and he has a productive cough consistent with

bronchiectasis infection or bronchitis.  We will admit the patient on IV 
steroids,

IV antibiotics.  We will start with Rocephin but if he does not improve quickly
, may

need to change for a better pseudomonal coverage given the bronchiectasis.  We 
will

continue bronchodilators, mucolytic, and consult Pulmonology.  Maintain oxygen

supplementation as needed. 

2. Acute hypoxic respiratory failure.  The patient is requiring supplemental 
oxygen

to maintain normal saturations secondary to chronic obstructive pulmonary 
disease,

bronchiectasis, and bronchitis.  We will wean as tolerated. 

3. Leukocytosis secondary to the recent steroids given as an outpatient.

4. Hypertension.  Resume home medications.

5. Dyslipidemia.  Resume specific home medications.

6. Chronic anticoagulation, presumably this is due to the patient's prior

supraventricular tachycardia without any documentation that I can find to 
suggest

that it was actually atrial fibrillation. 

7. Leukoplakia of the tongue.  Appears benign and related to the chronic 
irritation from

a partial denture.  Recommended follow up with Dentist/ENT as outpatient.





Job ID:  [id number]



[facility]

## 2019-07-12 NOTE — CON
DATE OF CONSULTATION:  



HISTORY OF PRESENT ILLNESS:  Britton Jimenez is an 88-year-old gentleman, who is well

known to me.  In fact, he was just seen in the office several days ago with

increasing cough and grossly purulent sputum and shortness of breath.  At that time,

his examination revealed extensive rhonchi, but no wheezing.  He is now telling me

that last night, he started having more coughing and sweats, headache, more

shortness of breath, and wheezing. 



He came to the hospital where apparently on top of his chronic lung disease, he was

found to have an elevated heart rate.  X-ray continued to show stable findings with

bilateral chronic changes.  This morning, he said he is feeling somewhat better. 



He has a history of chronic bronchitis and bronchiectasis with recurrent

bronchopulmonary infection, on cyclic antibiotics for several years.  He has tried a

Vest for percussion therapy which he failed. 



He sees Cardiology for recurrent SVT. 



He underwent a recent left carotid surgery without much complication.  In fact, he

underwent a recent sleep study which is negative for sleep apnea, but consistent

with severe hypoxemia.  He is on 3 L nasal oxygen 24 hours a day. 



He has severe limitation to activity such that, on regular day, he can barely walk

even 50 feet without getting markedly short of breath. 



He is a former smoker.



PAST MEDICAL HISTORY:  Otherwise pertinent for;

1. Recurrent SVT.

2. COPD.

3. CVA.

4. Prostate cancer.

5. Hypertension.

6. Bronchiectasis.



PREVIOUS SURGERIES:  

1. Lobectomy.

2. Right-sided previous ablation.

3. Appendix surgery.

4. Hernia surgery.

5. Cataract surgery.

6. Carpal tunnel surgery.

7. Carotid surgery.



ALLERGIES:  MULTIPLE;

1. DEMEROL.

2. CODEINE.

3. PENICILLIN.

4. SULFA.



MEDICATIONS:  Long list of home medication includes;

1. Low-dose prednisone 5 a day.

2. DuoNeb several times a day.

3. Dulera several times a day.

4. Eliquis 5 twice a day.

5. Multaq 200 twice a day.

6. Lasix 20 b.i.d.

7. Guaifenesin.

8. Mirapex 1.5 b.i.d.

9. Flomax 2 a day.



REVIEW OF SYSTEMS:  Otherwise negative.



PHYSICAL EXAMINATION:

VITAL SIGNS:  His O2 saturation this morning is 97 on 3 L, pulse 85, respirations

18, blood pressure 143/63. 

GENERAL:  He is awake, alert, and responsive.  Says he is less short of breath. 

CHEST:  Extensive rhonchi, crackles, and wheezing.  Marked expiration prolongation. 

CARDIAC:  Sinus tach. 

ABDOMEN:  Soft.



LABORATORY DATA:  White count 10,000, H and H 12 and 37, platelet count is normal.

Lytes are normal. 



IMPRESSION:  

1. Acute on chronic respiratory failure, chronic obstructive pulmonary disease

exacerbation, end-stage lung disease, bronchiectasis. 

2. Recurrent supraventricular tachycardia.

3. Severe deconditioning.

4. Major anxiety.



PLAN:  Continue present treatment, steroid and neb treatment.  Await culture. 



We will follow. 



Consultation note, 70 minutes, 50% in direct patient care.







Job ID:  [id number]

## 2019-07-13 LAB
ANION GAP SERPL CALC-SCNC: 16 MMOL/L (ref 10–20)
BUN SERPL-MCNC: 30 MG/DL (ref 8.4–25.7)
CALCIUM SERPL-MCNC: 9.1 MG/DL (ref 7.8–10.44)
CHLORIDE SERPL-SCNC: 98 MMOL/L (ref 98–107)
CO2 SERPL-SCNC: 25 MMOL/L (ref 23–31)
CREAT CL PREDICTED SERPL C-G-VRATE: 55 ML/MIN (ref 70–130)
GLUCOSE SERPL-MCNC: 112 MG/DL (ref 83–110)
MAGNESIUM SERPL-MCNC: 2.5 MG/DL (ref 1.6–2.6)
POTASSIUM SERPL-SCNC: 5.4 MMOL/L (ref 3.5–5.1)
SODIUM SERPL-SCNC: 134 MMOL/L (ref 136–145)
TROPONIN I SERPL DL<=0.01 NG/ML-MCNC: 0.04 NG/ML (ref ?–0.03)

## 2019-07-13 RX ADMIN — MOMETASONE FUROATE AND FORMOTEROL FUMARATE DIHYDRATE SCH PUFF: 200; 5 AEROSOL RESPIRATORY (INHALATION) at 09:24

## 2019-07-13 RX ADMIN — Medication SCH ML: at 20:29

## 2019-07-13 RX ADMIN — MOMETASONE FUROATE AND FORMOTEROL FUMARATE DIHYDRATE SCH PUFF: 200; 5 AEROSOL RESPIRATORY (INHALATION) at 18:08

## 2019-07-13 NOTE — PDOC.PN
- Subjective


Encounter Start Date: 07/13/19


Encounter Start Time: 09:00





Pt seen for followup re: acute on chronic hypoxic respiratory failure.  Reports 

on and off episodes of dizziness.





- Objective


Resuscitation Status - Order Detail:





07/12/19 01:58


Resuscitation Status Routine 


   Resuscitation Status: DNAR: NO Resuscitation


   Discussed with: Patient








MAR Reviewed: Yes


Vital Signs & Weight: 


 Vital Signs (12 hours)











  Temp Pulse Resp BP Pulse Ox


 


 07/13/19 09:24   91  16  


 


 07/13/19 09:09      93 L


 


 07/13/19 09:08   91  16  


 


 07/13/19 08:00      93 L


 


 07/13/19 07:48  97.8 F  91  20  163/74 H  93 L


 


 07/13/19 05:15  97.7 F  90  18  134/62  91 L








 Weight











Weight                         138 lb 1 oz














I&O: 


 











 07/12/19 07/13/19 07/14/19





 06:59 06:59 06:59


 


Intake Total  1082 360


 


Output Total  360 


 


Balance  722 360











Result Diagrams: 


 07/12/19 03:36





 07/13/19 21:28


Additional Labs: 





Labs reviewed by me





Phys Exam





- Physical Examination


Constitutional: NAD


HEENT: moist MMs, TM's clear


Neck: supple


Respiratory: clear to auscultation bilateral


Cardiovascular: RRR


Gastrointestinal: soft


Neurological: moves all 4 limbs


Psychiatric: normal affect





Dx/Plan


(1) Acute on chronic respiratory failure with hypoxia


Code(s): J96.21 - ACUTE AND CHRONIC RESPIRATORY FAILURE WITH HYPOXIA   Status: 

Acute   Comment: secondary to COPD exacerbation   





(2) COPD exacerbation


Code(s): J44.1 - CHRONIC OBSTRUCTIVE PULMONARY DISEASE W (ACUTE) EXACERBATION   

Status: Acute   Comment: continue oxygen, steroids, bronchodilators, 

ceftriaxone and levofloxacin   





(3) Dyslipidemia


Code(s): E78.5 - HYPERLIPIDEMIA, UNSPECIFIED   Status: Chronic   Comment: 

continue simvastatin   





(4) Hypertension


Code(s): I10 - ESSENTIAL (PRIMARY) HYPERTENSION   Status: Chronic   


Qualifiers: 


 


Comment: monitor vital signs, titrate antihypertensives as needed   





- Plan





* .


Check CT brain to r/o bleed/stroke





Review of Systems





- Review of Systems


Respiratory: Shortness of Breath, SOB with Excertion.  negative: Cough, 

Pleuritic Pain, Wheezing


Cardiovascular: negative: chest pain, palpitations, orthopnea, paroxysmal 

nocturnal dyspnea, edema, light headedness, other





- Medications/Allergies


Allergies/Adverse Reactions: 


 Allergies











Allergy/AdvReac Type Severity Reaction Status Date / Time


 


meperidine HCl [From Demerol] Allergy Intermediate Nausea Verified 07/12/19 14:

57


 


codeine Allergy   Verified 07/12/19 14:57


 


Penicillins Allergy  Rash Verified 07/12/19 14:57


 


sulfamethoxazole Allergy  Rash Verified 07/12/19 14:57





[From Bactrim]     


 


trimethoprim [From Bactrim] Allergy  Rash Verified 07/12/19 14:57











Medications: 


 Current Medications





Acetaminophen (Tylenol)  650 mg PO Q4H PRN


   PRN Reason: Headache/Fever/Mild Pain (1-3)


Albuterol Sulfate (Proventil Hfa)  2 puff INH Q6H PRN


   PRN Reason: SOB or Anxiety


Albuterol/Ipratropium (Duoneb)  3 ml NEB [id number] PRN


   PRN Reason: SOB &/or Wheezing


   Last Admin: 07/12/19 16:01 Dose:  3 ml


Albuterol/Ipratropium (Duoneb)  3 ml NEB TID-RT [facility]


   Last Admin: 07/13/19 09:08 Dose:  3 ml


Apixaban (Eliquis)  5 mg PO BID [facility]


   Last Admin: 07/13/19 08:02 Dose:  5 mg


Arformoterol Tartrate (Brovana)  15 mcg NEB BID-RT [facility]


   Last Admin: 07/13/19 09:08 Dose:  15 mcg


Clonidine (Catapres)  0.1 mg PO Q4H PRN


   PRN Reason: SBP Greater Than 185


   Last Admin: 07/12/19 16:30 Dose:  0.1 mg


Dronedarone (Multaq)  200 mg PO BID-WM [facility]


   Last Admin: 07/13/19 08:02 Dose:  200 mg


Ceftriaxone Sodium 1 gm/ (Sodium Chloride)  100 mls @ 200 mls/hr IVPB Q24HR [facility]


   Last Admin: 07/12/19 23:52 Dose:  100 mls


Levofloxacin (Levaquin)  500 mg PO 1200 [facility]


   Last Admin: 07/13/19 11:53 Dose:  500 mg


Loratadine (Claritin)  10 mg PO DAILYPRN PRN


   PRN Reason: Allergies


Melatonin (Melatonin)  3 mg PO HS PRN


   PRN Reason: Insomnia


   Last Admin: 07/13/19 00:31 Dose:  3 mg


Methylprednisolone Sodium Succinate (Solu-Medrol)  40 mg IVP Q6HR [facility]


   Last Admin: 07/13/19 11:53 Dose:  40 mg


Mometasone Furoate/Formoterol Fumar (Dulera 200 Mcg/5 Mcg Inhaler)  2 puff INH 

BID-RT [facility]


   Last Admin: 07/13/19 09:24 Dose:  2 puff


Pantoprazole Sodium (Protonix)  40 mg PO DAILY [facility]


   Last Admin: 07/13/19 08:03 Dose:  40 mg


Pramipexole Dihydrochloride (Mirapex)  1 mg PO QID [facility]


   Last Admin: 07/13/19 11:53 Dose:  1 mg


Tamsulosin HCl (Flomax)  0.4 mg PO DAILY [facility]


   Last Admin: 07/13/19 08:03 Dose:  0.4 mg


Tramadol HCl (Ultram)  50 mg PO Q6H PRN


   PRN Reason: Pain


   Last Admin: 07/12/19 20:04 Dose:  50 mg

## 2019-07-13 NOTE — CT
CT HEAD WITHOUT CONTRAST:

 

Date:  07/13/19 

 

INDICATON:

Vertigo. 

 

COMPARISON:  

CT head dated 03/20/17. 

 

FINDINGS:

Moderate chronic ischemic white matter changes again noted. Ventricles have normal size and position.
 Mild cortical volume loss. 

 

There is no evidence of intracranial hemorrhage. No mass, edema, or acute infarct identified. Sinuses
 are clear. 

 

IMPRESSION: 

No acute abnormality. 

 

 

POS: [facility]

## 2019-07-13 NOTE — PRG
DATE OF SERVICE:  07/13/2019



SUBJECTIVE:  Mr. Jimenez is still struggling with his breathing.  He says he does not

feel much better compared to admission. 



OBJECTIVE:  VITAL SIGNS:  On exam, temperature 97.8, pulse 91, respirations 16, O2

saturation 93% on 3 L, and blood pressure 163/74. 

HEENT:  Unremarkable. 

NECK:  No adenopathy or JVD. 

LUNGS:  Generalized poor air movement with occasional rhonchi. 

CARDIAC:  S1 and S2, regular. 

ABDOMEN:  Soft. 

EXTREMITIES:  No edema.



LABORATORY DATA:  White blood cell count 10, hematocrit 39, and platelet count 198.

Micro shows no growth to date. 



IMAGING STUDIES:  A brain CT showed no acute abnormalities.



ASSESSMENT:  

1. Chronic obstructive pulmonary disease/bronchiectasis with exacerbation.

2. Acute on chronic hypoxic respiratory failure.



PLAN:  I told the patient improvement will take time.  He is continuing on IV

Rocephin, steroids, and nebulization treatments.  Given his use of antibiotics prior

to admission, he may require addition of a different antibiotic.  I am not quite

sure what he was taking at home prior to admission, but he says he was taking some

kind of antibiotic. 







Job ID:  [id number]

## 2019-07-14 LAB — TROPONIN I SERPL DL<=0.01 NG/ML-MCNC: 0.05 NG/ML (ref ?–0.03)

## 2019-07-14 RX ADMIN — MOMETASONE FUROATE AND FORMOTEROL FUMARATE DIHYDRATE SCH PUFF: 200; 5 AEROSOL RESPIRATORY (INHALATION) at 18:57

## 2019-07-14 RX ADMIN — Medication SCH ML: at 08:28

## 2019-07-14 RX ADMIN — CEFTRIAXONE SCH MLS: 1 INJECTION, POWDER, FOR SOLUTION INTRAMUSCULAR; INTRAVENOUS at 23:18

## 2019-07-14 RX ADMIN — MOMETASONE FUROATE AND FORMOTEROL FUMARATE DIHYDRATE SCH PUFF: 200; 5 AEROSOL RESPIRATORY (INHALATION) at 07:11

## 2019-07-14 RX ADMIN — CEFTRIAXONE SCH MLS: 1 INJECTION, POWDER, FOR SOLUTION INTRAMUSCULAR; INTRAVENOUS at 00:32

## 2019-07-14 RX ADMIN — Medication SCH ML: at 20:14

## 2019-07-14 NOTE — PDOC.PN
- Subjective


Encounter Start Date: 07/14/19


Encounter Start Time: 09:00





Pt seen for followup re: acute on chronic hypoxic respiratory failure.  Pt was 

transfered to telemetry overnight for episodes of SVT.





- Objective


Resuscitation Status - Order Detail:





07/12/19 01:58


Resuscitation Status Routine 


   Resuscitation Status: DNAR: NO Resuscitation


   Discussed with: Patient








MAR Reviewed: Yes


Vital Signs & Weight: 


 Vital Signs (12 hours)











  Temp Pulse Resp BP BP Pulse Ox


 


 07/14/19 15:54  98.2 F  91  18  162/74 H   92 L


 


 07/14/19 14:10   83   119/63  


 


 07/14/19 13:08   93  16    93 L


 


 07/14/19 11:57  97.6 F  87  18  165/80 H   93 L


 


 07/14/19 08:21  98.0 F  112 H  18   151/77 H  95


 


 07/14/19 08:00       95


 


 07/14/19 07:16   76  16    92 L


 


 07/14/19 07:15   86  16    92 L


 


 07/14/19 07:11   86  16    92 L


 


 07/14/19 07:10       92 L








 Weight











Admit Weight                   138 lb


 


Weight                         138 lb 1 oz














I&O: 


 











 07/13/19 07/14/19 07/15/19





 06:59 06:59 06:59


 


Intake Total 1082 840 


 


Output Total 360  


 


Balance 722 840 











Result Diagrams: 


 07/12/19 03:36





 07/13/19 21:28


EKG Reviewed by me: Yes (Tele:  runs of SVT overnight)





Phys Exam





- Physical Examination


Constitutional: NAD


HEENT: moist MMs


Neck: supple


Respiratory: clear to auscultation bilateral


Cardiovascular: RRR


Gastrointestinal: soft


Neurological: moves all 4 limbs


Psychiatric: normal affect





Dx/Plan


(1) Acute on chronic respiratory failure with hypoxia


Code(s): J96.21 - ACUTE AND CHRONIC RESPIRATORY FAILURE WITH HYPOXIA   Status: 

Acute   Comment: Improving, secondary to COPD exacerbation   





(2) SVT (supraventricular tachycardia)


Code(s): I47.1 - SUPRAVENTRICULAR TACHYCARDIA   Status: Acute   Comment: Pt 

having runs of SVT, especially with exertion, currently on cardizem drip.   





(3) COPD exacerbation


Code(s): J44.1 - CHRONIC OBSTRUCTIVE PULMONARY DISEASE W (ACUTE) EXACERBATION   

Status: Acute   Comment: Improving with oxygen, steroids, bronchodilators, 

ceftriaxone and levofloxacin   





(4) Dyslipidemia


Code(s): E78.5 - HYPERLIPIDEMIA, UNSPECIFIED   Status: Chronic   Comment: on 

simvastatin   





(5) Hypertension


Code(s): I10 - ESSENTIAL (PRIMARY) HYPERTENSION   Status: Chronic   


Qualifiers: 


 


Comment: monitor vital signs, titrate antihypertensives as needed   





- Plan


continue antibiotics, PT/OT, respiratory therapy, out of bed/ambulate





* .


administer kayexalate for hyperkalemia last night, recheck potassium level in 

AM.


Consult Dr Willingham in AM re: SVT





Review of Systems





- Review of Systems


Respiratory: SOB with Excertion.  negative: Cough, Shortness of Breath, 

Pleuritic Pain, Wheezing


Cardiovascular: negative: chest pain, palpitations, orthopnea, paroxysmal 

nocturnal dyspnea, edema, light headedness





- Medications/Allergies


Allergies/Adverse Reactions: 


 Allergies











Allergy/AdvReac Type Severity Reaction Status Date / Time


 


meperidine HCl [From Demerol] Allergy Intermediate Nausea Verified 07/12/19 14:

57


 


codeine Allergy   Verified 07/12/19 14:57


 


Penicillins Allergy  Rash Verified 07/12/19 14:57


 


sulfamethoxazole Allergy  Rash Verified 07/12/19 14:57





[From Bactrim]     


 


trimethoprim [From Bactrim] Allergy  Rash Verified 07/12/19 14:57











Medications: 


 Current Medications





Acetaminophen (Tylenol)  650 mg PO Q4H PRN


   PRN Reason: Headache/Fever/Mild Pain (1-3)


Albuterol Sulfate (Proventil Hfa)  2 puff INH PRN PRN


   PRN Reason: SOB or Anxiety


   Last Admin: 07/13/19 15:21 Dose:  2 puff


Albuterol/Ipratropium (Duoneb)  3 ml NEB Q9BN-AM PRN


   PRN Reason: SOB &/or Wheezing


   Last Admin: 07/12/19 16:01 Dose:  3 ml


Albuterol/Ipratropium (Duoneb)  3 ml NEB TID-RT [facility]


   Last Admin: 07/14/19 13:08 Dose:  3 ml


Alprazolam (Xanax)  0.25 mg PO BIDPRN PRN


   PRN Reason: Anxiety


   Last Admin: 07/13/19 16:34 Dose:  0.25 mg


Apixaban (Eliquis)  5 mg PO BID [facility]


   Last Admin: 07/14/19 08:27 Dose:  5 mg


Arformoterol Tartrate (Brovana)  15 mcg NEB BID-RT [facility]


   Last Admin: 07/14/19 07:15 Dose:  15 mcg


Bisacodyl (Dulcolax)  10 mg PO DAILYPRN PRN


   PRN Reason: CONSTIPATION


   Last Admin: 07/14/19 10:36 Dose:  10 mg


Clonidine (Catapres)  0.1 mg PO Q4H PRN


   PRN Reason: SBP Greater Than 185


   Last Admin: 07/12/19 16:30 Dose:  0.1 mg


Dronedarone (Multaq)  200 mg PO BID-WM [facility]


   Last Admin: 07/14/19 08:27 Dose:  200 mg


Ceftriaxone Sodium 1 gm/ (Sodium Chloride)  100 mls @ 200 mls/hr IVPB Q24HR [facility]


   Last Admin: 07/14/19 00:32 Dose:  100 mls


Diltiazem HCl 125 mg/ Sodium (Chloride)  125 mls @ 7.5 mls/hr IVPB INF [facility]; 

Protocol


   Last Admin: 07/14/19 13:59 Dose:  125 mls


Levofloxacin (Levaquin)  500 mg PO 1200 [facility]


   Last Admin: 07/14/19 12:01 Dose:  500 mg


Loratadine (Claritin)  10 mg PO DAILYPRN PRN


   PRN Reason: Allergies


Melatonin (Melatonin)  3 mg PO HS PRN


   PRN Reason: Insomnia


   Last Admin: 07/14/19 00:35 Dose:  3 mg


Methylprednisolone Sodium Succinate (Solu-Medrol)  40 mg IVP Q6HR [facility]


   Last Admin: 07/14/19 12:01 Dose:  40 mg


Mometasone Furoate/Formoterol Fumar (Dulera 200 Mcg/5 Mcg Inhaler)  2 puff INH 

BID-RT [facility]


   Last Admin: 07/14/19 07:11 Dose:  2 puff


Pantoprazole Sodium (Protonix)  40 mg PO DAILY [facility]


   Last Admin: 07/14/19 08:26 Dose:  40 mg


Pramipexole Dihydrochloride (Mirapex)  1 mg PO QID [facility]


   Last Admin: 07/14/19 12:01 Dose:  1 mg


Sodium Chloride (Flush - Normal Saline)  10 ml IVF Q12HR [facility]


   Last Admin: 07/14/19 08:28 Dose:  10 ml


Sodium Chloride (Flush - Normal Saline)  10 ml IVF PRN PRN


   PRN Reason: Saline Flush


Sodium Polystyrene Sulfonate (Kayexelate Oral Susp 15 Gm/60 Ml)  30 gm PO ONE 

[facility]


   Stop: 07/14/19 20:00


Tamsulosin HCl (Flomax)  0.4 mg PO DAILY [facility]


   Last Admin: 07/14/19 08:26 Dose:  0.4 mg


Tramadol HCl (Ultram)  50 mg PO Q6H PRN


   PRN Reason: Pain


   Last Admin: 07/14/19 08:32 Dose:  50 mg

## 2019-07-14 NOTE — PRG
DATE OF SERVICE:  07/14/2019



SUBJECTIVE:  The patient was moved down to the telemetry unit because he developed

atrial flutter.  He is currently on a diltiazem drip.  He says he is breathing

better than yesterday. 



OBJECTIVE:  VITAL SIGNS:  On exam, his pulse is running between 87 and 112,

temperature 97.6, blood pressure 165/80, and O2 saturation 93% on 3 L. 

HEENT:  Unremarkable. 

NECK:  No adenopathy or JVD. 

CARDIAC:  S1 and S2.  Tachycardic, slightly irregular. 

ABDOMEN:  Soft. 

EXTREMITIES:  No edema.



LABORATORY DATA:  No labs were obtained today.



ASSESSMENT:  

1. Chronic obstructive pulmonary disease with exacerbation.

2. Atrial flutter.



PLAN:  

1. Continue antibiotics, steroids, and nebulization treatments.

2. Atrial flutter treatment per Cardiology.







Job ID:  [id number]

## 2019-07-15 LAB
ANION GAP SERPL CALC-SCNC: 13 MMOL/L (ref 10–20)
BASOPHILS # BLD AUTO: 0 THOU/UL (ref 0–0.2)
BASOPHILS NFR BLD AUTO: 0.2 % (ref 0–1)
BUN SERPL-MCNC: 26 MG/DL (ref 8.4–25.7)
CALCIUM SERPL-MCNC: 9 MG/DL (ref 7.8–10.44)
CHLORIDE SERPL-SCNC: 98 MMOL/L (ref 98–107)
CO2 SERPL-SCNC: 31 MMOL/L (ref 23–31)
CREAT CL PREDICTED SERPL C-G-VRATE: 55 ML/MIN (ref 70–130)
EOSINOPHIL # BLD AUTO: 0 THOU/UL (ref 0–0.7)
EOSINOPHIL NFR BLD AUTO: 0.1 % (ref 0–10)
GLUCOSE SERPL-MCNC: 137 MG/DL (ref 83–110)
HGB BLD-MCNC: 13.1 G/DL (ref 14–18)
LYMPHOCYTES # BLD: 0.3 THOU/UL (ref 1.2–3.4)
LYMPHOCYTES NFR BLD AUTO: 1.5 % (ref 21–51)
MCH RBC QN AUTO: 35.5 PG (ref 27–31)
MCV RBC AUTO: 113 FL (ref 78–98)
MONOCYTES # BLD AUTO: 1.3 THOU/UL (ref 0.11–0.59)
MONOCYTES NFR BLD AUTO: 6.7 % (ref 0–10)
NEUTROPHILS # BLD AUTO: 17.8 THOU/UL (ref 1.4–6.5)
NEUTROPHILS NFR BLD AUTO: 91.5 % (ref 42–75)
PLATELET # BLD AUTO: 183 THOU/UL (ref 130–400)
POTASSIUM SERPL-SCNC: 3.5 MMOL/L (ref 3.5–5.1)
RBC # BLD AUTO: 3.7 MILL/UL (ref 4.7–6.1)
SODIUM SERPL-SCNC: 138 MMOL/L (ref 136–145)
WBC # BLD AUTO: 19.4 THOU/UL (ref 4.8–10.8)

## 2019-07-15 RX ADMIN — Medication SCH ML: at 08:43

## 2019-07-15 RX ADMIN — ALUMINUM HYDROXIDE AND MAGNESIUM CARBONATE PRN TAB: 160; 105 TABLET, CHEWABLE ORAL at 20:06

## 2019-07-15 RX ADMIN — MOMETASONE FUROATE AND FORMOTEROL FUMARATE DIHYDRATE SCH PUFF: 200; 5 AEROSOL RESPIRATORY (INHALATION) at 08:12

## 2019-07-15 RX ADMIN — MOMETASONE FUROATE AND FORMOTEROL FUMARATE DIHYDRATE SCH PUFF: 200; 5 AEROSOL RESPIRATORY (INHALATION) at 18:42

## 2019-07-15 NOTE — PDOC.PN
- Subjective


Encounter Start Date: 07/15/19


Encounter Start Time: 09:00





Pt seen for followup re: hypoxic respiratory failure.  Feels much better today.





- Objective


Resuscitation Status - Order Detail:





07/12/19 01:58


Resuscitation Status Routine 


   Resuscitation Status: DNAR: NO Resuscitation


   Discussed with: Patient








MAR Reviewed: Yes


Vital Signs & Weight: 


 Vital Signs (12 hours)











  Temp Pulse Resp BP Pulse Ox


 


 07/15/19 15:56  98.2 F  103 H  18  166/72 H  93 L


 


 07/15/19 13:35   117 H  20  


 


 07/15/19 12:17  98.0 F  107 H  18  159/77 H  94 L


 


 07/15/19 08:44  98.2 F  94  16  167/79 H  97


 


 07/15/19 08:12      95


 


 07/15/19 08:10   83  16   95


 


 07/15/19 08:00      97








 Weight











Admit Weight                   138 lb


 


Weight                         138 lb 1 oz














I&O: 


 











 07/14/19 07/15/19 07/16/19





 06:59 06:59 06:59


 


Intake Total 840  


 


Balance 840  











Result Diagrams: 


 07/15/19 04:03





 07/15/19 04:03


EKG Reviewed by me: Yes (Tele:  NSR)





Phys Exam





- Physical Examination


HEENT: moist MMs


Neck: no JVD


Respiratory: clear to auscultation bilateral


Cardiovascular: RRR


Gastrointestinal: soft


Neurological: moves all 4 limbs


Psychiatric: normal affect





Dx/Plan


(1) Acute on chronic respiratory failure with hypoxia


Code(s): J96.21 - ACUTE AND CHRONIC RESPIRATORY FAILURE WITH HYPOXIA   Status: 

Acute   Comment: signioficantly improved, secondary to COPD exacerbation   





(2) SVT (supraventricular tachycardia)


Code(s): I47.1 - SUPRAVENTRICULAR TACHYCARDIA   Status: Acute   Comment: pt 

transitioned to oral CCB   





(3) COPD exacerbation


Code(s): J44.1 - CHRONIC OBSTRUCTIVE PULMONARY DISEASE W (ACUTE) EXACERBATION   

Status: Acute   Comment: Improving, continue oxygen, oral steroids, 

bronchodilators, and oral levofloxacin   





(4) Dyslipidemia


Code(s): E78.5 - HYPERLIPIDEMIA, UNSPECIFIED   Status: Chronic   Comment: on 

simvastatin   





(5) Hypertension


Code(s): I10 - ESSENTIAL (PRIMARY) HYPERTENSION   Status: Chronic   


Qualifiers: 


 


Comment: monitor vital signs, titrate antihypertensives as needed   





- Plan





* .








Review of Systems





- Review of Systems


Respiratory: SOB with Excertion.  negative: Cough, Shortness of Breath, 

Pleuritic Pain, Wheezing


Cardiovascular: negative: chest pain, palpitations, orthopnea, paroxysmal 

nocturnal dyspnea, edema, light headedness





- Medications/Allergies


Allergies/Adverse Reactions: 


 Allergies











Allergy/AdvReac Type Severity Reaction Status Date / Time


 


meperidine HCl [From Demerol] Allergy Intermediate Nausea Verified 07/12/19 14:

57


 


codeine Allergy   Verified 07/12/19 14:57


 


Penicillins Allergy  Rash Verified 07/12/19 14:57


 


sulfamethoxazole Allergy  Rash Verified 07/12/19 14:57





[From Bactrim]     


 


trimethoprim [From Bactrim] Allergy  Rash Verified 07/12/19 14:57











Medications: 


 Current Medications





Acetaminophen (Tylenol)  650 mg PO Q4H PRN


   PRN Reason: Headache/Fever/Mild Pain (1-3)


Albuterol Sulfate (Proventil Hfa)  2 puff INH PRN PRN


   PRN Reason: SOB or Anxiety


   Last Admin: 07/13/19 15:21 Dose:  2 puff


Albuterol/Ipratropium (Duoneb)  3 ml NEB [id number] PRN


   PRN Reason: SOB &/or Wheezing


   Last Admin: 07/15/19 01:25 Dose:  3 ml


Albuterol/Ipratropium (Duoneb)  3 ml NEB TID-RT [facility]


   Last Admin: 07/15/19 13:35 Dose:  3 ml


Alprazolam (Xanax)  0.25 mg PO BIDPRN PRN


   PRN Reason: Anxiety


   Last Admin: 07/13/19 16:34 Dose:  0.25 mg


Apixaban (Eliquis)  5 mg PO BID [facility]


   Last Admin: 07/15/19 08:40 Dose:  5 mg


Arformoterol Tartrate (Brovana)  15 mcg NEB BID-RT [facility]


   Last Admin: 07/15/19 08:11 Dose:  15 mcg


Bisacodyl (Dulcolax)  10 mg PO DAILYPRN PRN


   PRN Reason: CONSTIPATION


   Last Admin: 07/14/19 10:36 Dose:  10 mg


Clonidine (Catapres)  0.1 mg PO Q4H PRN


   PRN Reason: SBP Greater Than 185


   Last Admin: 07/15/19 03:50 Dose:  0.1 mg


Diltiazem HCl (Cardizem Cd)  120 mg PO DAILY [facility]


Dronedarone (Multaq)  200 mg PO BID-WM [facility]


   Last Admin: 07/15/19 17:44 Dose:  200 mg


Levofloxacin (Levaquin)  500 mg PO 1200 [facility]


   Last Admin: 07/15/19 12:22 Dose:  500 mg


Loratadine (Claritin)  10 mg PO DAILYPRN PRN


   PRN Reason: Allergies


   Last Admin: 07/14/19 23:55 Dose:  10 mg


Melatonin (Melatonin)  3 mg PO HS PRN


   PRN Reason: Insomnia


   Last Admin: 07/14/19 23:17 Dose:  3 mg


Mometasone Furoate/Formoterol Fumar (Dulera 200 Mcg/5 Mcg Inhaler)  2 puff INH 

BID-RT [facility]


   Last Admin: 07/15/19 08:12 Dose:  2 puff


Pantoprazole Sodium (Protonix)  40 mg PO DAILY [facility]


   Last Admin: 07/15/19 08:40 Dose:  40 mg


Pramipexole Dihydrochloride (Mirapex)  1 mg PO QID [facility]


   Last Admin: 07/15/19 17:44 Dose:  1 mg


Prednisone (Prednisone)  40 mg PO QAM-WM [facility]


Sodium Chloride (Flush - Normal Saline)  10 ml IVF Q12HR [facility]


   Last Admin: 07/15/19 08:43 Dose:  10 ml


Sodium Chloride (Flush - Normal Saline)  10 ml IVF PRN PRN


   PRN Reason: Saline Flush


Tamsulosin HCl (Flomax)  0.8 mg PO HS [facility]


Tramadol HCl (Ultram)  50 mg PO Q6H PRN


   PRN Reason: Pain


   Last Admin: 07/15/19 08:40 Dose:  50 mg

## 2019-07-15 NOTE — PRG
DATE OF SERVICE:  07/15/2019



SUBJECTIVE:  This morning, he is better.  He is started on Cardizem because of SVT,

less cough, less wheezing, and less chest pain. 



Blood pressure is elevated this morning.  He has probably not gotten his blood

pressure medication. 



OBJECTIVE:  VITAL SIGNS:  His sats 94% on 4 L, pulse 88, and respiratory rate of 22. 

CHEST:  Extensive rhonchi and crackle. 

CARDIAC:  Normal S1 and S2.  No mass.



LABORATORY DATA:  White count 19,000.  H and H unremarkable.



IMPRESSIONS:  End-stage respiratory failure, bronchitis, bronchiectasis, and chronic

obstructive pulmonary disease. 



Switch him to oral antibiotics or prednisone.  Discontinue Cardizem and start on

p.o. medication as per Cardiology.  We will follow. 







Job ID:  [id number]

## 2019-07-15 NOTE — EKG
Test Reason : 

Blood Pressure : ***/*** mmHG

Vent. Rate : 103 BPM     Atrial Rate : 103 BPM

   P-R Int : 188 ms          QRS Dur : 088 ms

    QT Int : 344 ms       P-R-T Axes : 039 039 033 degrees

   QTc Int : 450 ms

 

Sinus tachycardia with Premature supraventricular complexes

Cannot rule out Anterior infarct (cited on or before 25-FEB-2019)

Abnormal ECG

Confirmed by FELISA VEGA ([id number]) on 7/15/2019 5:05:04 PM

 

Referred By:             Confirmed By:FELISA VEGA

## 2019-07-15 NOTE — EKG
Test Reason : STAT SVT

Blood Pressure : ***/*** mmHG

Vent. Rate : 154 BPM     Atrial Rate : 170 BPM

   P-R Int : 000 ms          QRS Dur : 088 ms

    QT Int : 298 ms       P-R-T Axes : 000 065 062 degrees

   QTc Int : 477 ms

 

Multifocal atrial tachycardia Premature ventricular and fusion complexes

Abnormal ECG

 

Confirmed by FELISA VEGA ([id number]) on 7/15/2019 5:09:22 PM

 

Referred By:  JASMINA           Confirmed By:FELISA VEGA

## 2019-07-16 LAB
ANION GAP SERPL CALC-SCNC: 12 MMOL/L (ref 10–20)
BUN SERPL-MCNC: 28 MG/DL (ref 8.4–25.7)
CALCIUM SERPL-MCNC: 8.7 MG/DL (ref 7.8–10.44)
CHLORIDE SERPL-SCNC: 100 MMOL/L (ref 98–107)
CO2 SERPL-SCNC: 29 MMOL/L (ref 23–31)
CREAT CL PREDICTED SERPL C-G-VRATE: 58 ML/MIN (ref 70–130)
GLUCOSE SERPL-MCNC: 142 MG/DL (ref 83–110)
HGB BLD-MCNC: 12.7 G/DL (ref 14–18)
MACROCYTES BLD QL SMEAR: (no result) (100X)
MCH RBC QN AUTO: 35.2 PG (ref 27–31)
MCV RBC AUTO: 113 FL (ref 78–98)
MDIFF COMPLETE?: YES
PLATELET # BLD AUTO: 158 THOU/UL (ref 130–400)
POTASSIUM SERPL-SCNC: 3.3 MMOL/L (ref 3.5–5.1)
RBC # BLD AUTO: 3.61 MILL/UL (ref 4.7–6.1)
SODIUM SERPL-SCNC: 138 MMOL/L (ref 136–145)
WBC # BLD AUTO: 24.4 THOU/UL (ref 4.8–10.8)

## 2019-07-16 RX ADMIN — MOMETASONE FUROATE AND FORMOTEROL FUMARATE DIHYDRATE SCH PUFF: 200; 5 AEROSOL RESPIRATORY (INHALATION) at 18:30

## 2019-07-16 RX ADMIN — Medication SCH ML: at 21:10

## 2019-07-16 RX ADMIN — Medication SCH ML: at 08:04

## 2019-07-16 RX ADMIN — Medication SCH: at 03:20

## 2019-07-16 RX ADMIN — MOMETASONE FUROATE AND FORMOTEROL FUMARATE DIHYDRATE SCH PUFF: 200; 5 AEROSOL RESPIRATORY (INHALATION) at 07:14

## 2019-07-16 NOTE — PDOC.PN
- Subjective


Encounter Start Date: 07/16/19


Encounter Start Time: 09:20





Pt seen for followup re: acute on chronic hypoxic resp failure.  Says he feels 

better.





- Objective


Resuscitation Status - Order Detail:





07/12/19 01:58


Resuscitation Status Routine 


   Resuscitation Status: DNAR: NO Resuscitation


   Discussed with: Patient








MAR Reviewed: Yes


Vital Signs & Weight: 


 Vital Signs (12 hours)











  Temp Pulse Resp BP BP Pulse Ox


 


 07/16/19 17:40   92  20   137/66 


 


 07/16/19 16:25     188/91 H  


 


 07/16/19 16:20  97.6 F  99  20   188/91 H  92 L


 


 07/16/19 14:00   77  16    97


 


 07/16/19 11:17  97.4 F L  113 H  20   126/96 H  93 L


 


 07/16/19 09:50   107 H  20   182/81 H 


 


 07/16/19 08:03   98    


 


 07/16/19 08:00  97.6 F  98  20   200/96 H  94 L


 


 07/16/19 07:15       96


 


 07/16/19 07:13   99  18    96








 Weight











Admit Weight                   138 lb


 


Weight                         138 lb 1 oz














I&O: 


 











 07/15/19 07/16/19 07/17/19





 06:59 06:59 06:59


 


Intake Total  1050 1040


 


Output Total  1050 


 


Balance  0 1040











Result Diagrams: 


 07/16/19 04:18





 07/16/19 04:18


EKG Reviewed by me: Yes (Tele:  NSR)





Phys Exam





- Physical Examination


Constitutional: NAD


HEENT: moist MMs


Neck: supple


Respiratory: clear to auscultation bilateral


Cardiovascular: RRR


Gastrointestinal: soft


Neurological: moves all 4 limbs


Psychiatric: normal affect





Dx/Plan


(1) Acute on chronic respiratory failure with hypoxia


Code(s): J96.21 - ACUTE AND CHRONIC RESPIRATORY FAILURE WITH HYPOXIA   Status: 

Acute   Comment: significantly improved   





(2) SVT (supraventricular tachycardia)


Code(s): I47.1 - SUPRAVENTRICULAR TACHYCARDIA   Status: Acute   Comment: 

continue Cardizem CD   





(3) COPD exacerbation


Code(s): J44.1 - CHRONIC OBSTRUCTIVE PULMONARY DISEASE W (ACUTE) EXACERBATION   

Status: Acute   Comment: Improving, on oxygen, oral steroids, bronchodilators, 

and oral levofloxacin   





(4) Dyslipidemia


Code(s): E78.5 - HYPERLIPIDEMIA, UNSPECIFIED   Status: Chronic   Comment: 

continue simvastatin   





(5) Hypertension


Code(s): I10 - ESSENTIAL (PRIMARY) HYPERTENSION   Status: Chronic   


Qualifiers: 


 


Comment: Pt started on lisinopril   





- Plan


continue antibiotics, PT/OT, respiratory therapy, out of bed/ambulate





* .


Eliquis dose decreased today.


Lisinopril added (for better better blood pressure control).





Review of Systems





- Review of Systems


Constitutional: negative: fever, chills, sweats, weakness, malaise


Respiratory: SOB with Excertion


Cardiovascular: negative: chest pain, palpitations, orthopnea, paroxysmal 

nocturnal dyspnea, edema, light headedness





- Medications/Allergies


Allergies/Adverse Reactions: 


 Allergies











Allergy/AdvReac Type Severity Reaction Status Date / Time


 


meperidine HCl [From Demerol] Allergy Intermediate Nausea Verified 07/12/19 14:

57


 


codeine Allergy   Verified 07/12/19 14:57


 


Penicillins Allergy  Rash Verified 07/12/19 14:57


 


sulfamethoxazole Allergy  Rash Verified 07/12/19 14:57





[From Bactrim]     


 


trimethoprim [From Bactrim] Allergy  Rash Verified 07/12/19 14:57











Medications: 


 Current Medications





Acetaminophen (Tylenol)  650 mg PO Q4H PRN


   PRN Reason: Headache/Fever/Mild Pain (1-3)


Al Hydroxide/Mg Trisilicate (Gaviscon Chew)  1 tab PO PC PRN


   PRN Reason: Gas Pain


   Last Admin: 07/15/19 20:06 Dose:  1 tab


Albuterol Sulfate (Proventil Hfa)  2 puff INH PRN PRN


   PRN Reason: SOB or Anxiety


   Last Admin: 07/13/19 15:21 Dose:  2 puff


Albuterol/Ipratropium (Duoneb)  3 ml NEB [id number] PRN


   PRN Reason: SOB &/or Wheezing


   Last Admin: 07/15/19 01:25 Dose:  3 ml


Albuterol/Ipratropium (Duoneb)  3 ml NEB TID-RT [facility]


   Last Admin: 07/16/19 14:00 Dose:  3 ml


Alprazolam (Xanax)  0.25 mg PO BIDPRN PRN


   PRN Reason: Anxiety


   Last Admin: 07/13/19 16:34 Dose:  0.25 mg


Apixaban (Eliquis)  5 mg PO BID [facility]


   Last Admin: 07/16/19 08:02 Dose:  5 mg


Arformoterol Tartrate (Brovana)  15 mcg NEB BID-RT [facility]


   Last Admin: 07/16/19 07:14 Dose:  15 mcg


Bisacodyl (Dulcolax)  10 mg PO DAILYPRN PRN


   PRN Reason: CONSTIPATION


   Last Admin: 07/14/19 10:36 Dose:  10 mg


Clonidine (Catapres)  0.1 mg PO Q4H PRN


   PRN Reason: SBP Greater Than 185


   Last Admin: 07/16/19 16:25 Dose:  0.1 mg


Diltiazem HCl (Cardizem Cd)  120 mg PO DAILY [facility]


   Last Admin: 07/16/19 08:03 Dose:  120 mg


Dronedarone (Multaq)  200 mg PO BID-WM [facility]


   Last Admin: 07/16/19 16:19 Dose:  200 mg


Levofloxacin (Levaquin)  500 mg PO 1200 [facility]


   Last Admin: 07/16/19 11:52 Dose:  500 mg


Lisinopril (Zestril)  10 mg PO DAILY [facility]


Loratadine (Claritin)  10 mg PO DAILYPRN PRN


   PRN Reason: Allergies


   Last Admin: 07/14/19 23:55 Dose:  10 mg


Melatonin (Melatonin)  3 mg PO HS PRN


   PRN Reason: Insomnia


   Last Admin: 07/15/19 20:03 Dose:  3 mg


Mometasone Furoate/Formoterol Fumar (Dulera 200 Mcg/5 Mcg Inhaler)  2 puff INH 

BID-RT [facility]


   Last Admin: 07/16/19 07:14 Dose:  2 puff


Pramipexole Dihydrochloride (Mirapex)  1 mg PO QID [facility]


   Last Admin: 07/16/19 16:20 Dose:  1 mg


Prednisone (Prednisone)  40 mg PO QAM-WM [facility]


   Last Admin: 07/16/19 08:03 Dose:  40 mg


Sodium Chloride (Flush - Normal Saline)  10 ml IVF Q12HR [facility]


   Last Admin: 07/16/19 08:04 Dose:  10 ml


Sodium Chloride (Flush - Normal Saline)  10 ml IVF PRN PRN


   PRN Reason: Saline Flush


Tamsulosin HCl (Flomax)  0.8 mg PO HS [facility]


Tramadol HCl (Ultram)  50 mg PO Q6H PRN


   PRN Reason: Pain


   Last Admin: 07/16/19 16:19 Dose:  50 mg

## 2019-07-16 NOTE — PRG
DATE OF SERVICE:  07/16/2019



SUBJECTIVE:  Britton Jimenez is better this morning.



OBJECTIVE:  VITAL SIGNS:  Pulse of 107, atrial fibrillation; temperature 97;

saturations 94% on 3 L, blood pressure 182/81. 

CHEST:  Decreased breath sounds.  No wheezing.  Extensive rhonchi and crackles. 

CARDIAC:  SVT. 

ABDOMEN:  Soft.



IMPRESSION:  

1. Chronic obstructive pulmonary disease, bronchiectasis, bronchitis exacerbation.

2. Recurrent supraventricular tachycardia.



PLAN:  He is on Cardizem and Multaq, can be discharged home any time.  Follow up in

the office. 







Job ID:  [id number]

## 2019-07-16 NOTE — PRG
DATE OF SERVICE:  07/16/2019



SUBJECTIVE:  Mr. Jimenez is having some significant breathing troubles 
intermittently,

but seems to be better right this minute. 



OBJECTIVE:  VITAL SIGNS:  His blood pressure 126/96 now after some lisinopril.

Pulse is 100. 

LUNGS:  Decreased breath sounds. 

CARDIAC:  Tachycardic for rest. 

ABDOMEN:  Soft, nontender.



ASSESSMENT:  

1. Paroxysmal supraventricular tachycardia, stable.

2. History of pulmonary emboli in the past.

3. Severe chronic obstructive pulmonary disease.



PLAN:  He is on Eliquis twice a day 5 mg.  We could reduce the dose to 2.5.  He

weighs 63 kg.  He is really right on the borderline.  He is 88 years of age.  I 
do

not see documentation of atrial fibrillation.  The main would be to try to 
prevent

recurrent pulmonary emboli.  It would be okay to reduce to 2.5 mg twice a

day Eliquis. 

1. Cardizem  mg a day.

2. Multaq, he can only tolerate 200 mg twice a day.







Job ID:  [id number]



MTDD

## 2019-07-17 LAB
ANION GAP SERPL CALC-SCNC: 10 MMOL/L (ref 10–20)
BUN SERPL-MCNC: 22 MG/DL (ref 8.4–25.7)
CALCIUM SERPL-MCNC: 8.5 MG/DL (ref 7.8–10.44)
CHLORIDE SERPL-SCNC: 97 MMOL/L (ref 98–107)
CO2 SERPL-SCNC: 32 MMOL/L (ref 23–31)
CREAT CL PREDICTED SERPL C-G-VRATE: 64 ML/MIN (ref 70–130)
GLUCOSE SERPL-MCNC: 87 MG/DL (ref 83–110)
HGB BLD-MCNC: 12.5 G/DL (ref 14–18)
MCH RBC QN AUTO: 35.5 PG (ref 27–31)
MCV RBC AUTO: 115 FL (ref 78–98)
MDIFF COMPLETE?: YES
PLATELET # BLD AUTO: 133 THOU/UL (ref 130–400)
POTASSIUM SERPL-SCNC: 4 MMOL/L (ref 3.5–5.1)
RBC # BLD AUTO: 3.53 MILL/UL (ref 4.7–6.1)
SODIUM SERPL-SCNC: 135 MMOL/L (ref 136–145)
WBC # BLD AUTO: 23.1 THOU/UL (ref 4.8–10.8)

## 2019-07-17 RX ADMIN — MOMETASONE FUROATE AND FORMOTEROL FUMARATE DIHYDRATE SCH PUFF: 200; 5 AEROSOL RESPIRATORY (INHALATION) at 18:43

## 2019-07-17 RX ADMIN — ALUMINUM HYDROXIDE AND MAGNESIUM CARBONATE PRN TAB: 160; 105 TABLET, CHEWABLE ORAL at 19:24

## 2019-07-17 RX ADMIN — NYSTATIN SCH UNITS: 100000 SUSPENSION ORAL at 11:16

## 2019-07-17 RX ADMIN — Medication SCH ML: at 19:42

## 2019-07-17 RX ADMIN — MOMETASONE FUROATE AND FORMOTEROL FUMARATE DIHYDRATE SCH: 200; 5 AEROSOL RESPIRATORY (INHALATION) at 09:00

## 2019-07-17 RX ADMIN — Medication SCH ML: at 08:23

## 2019-07-17 RX ADMIN — NYSTATIN SCH UNITS: 100000 SUSPENSION ORAL at 19:41

## 2019-07-17 RX ADMIN — NYSTATIN SCH UNITS: 100000 SUSPENSION ORAL at 16:48

## 2019-07-17 RX ADMIN — ALUMINUM HYDROXIDE AND MAGNESIUM CARBONATE PRN TAB: 160; 105 TABLET, CHEWABLE ORAL at 09:00

## 2019-07-17 NOTE — PRG
DATE OF SERVICE:  07/17/2019



SUBJECTIVE:  This morning, he is better.  He is nauseated after eating some

breakfast, but his breathing is better. 



OBJECTIVE:  VITAL SIGNS:  Saturations are 92% on 3 L, respiratory rate 18,

temperature _98.4 pulse 96, and blood pressure 185/87. 

CHEST:  Rhonchi and crackles. 

CARDIAC:  Normal S1 and S2.  No gallops. 

ABDOMEN:  No masses.



IMPRESSION:  

1. Leukocytosis secondary to chronic bronchitis and bronchiectasis.

2. Supraventricular tachycardia. 



Continue prednisone, Levaquin, and supportive care.  Disposition as per 
Cardiology.







Job ID:  [id number]



MTDD

## 2019-07-17 NOTE — PDOC.PN
- Subjective


Encounter Start Date: 07/17/19


Encounter Start Time: 10:39





Has abdominal discomfort this morning.  Feels like his intestinal angina.  Has 

had a transvascular procedure in the past to address this (radial approach per 

patient).  Says he continues to have some intermittent intestinal angina if he 

eats too much.  Feels like it might be improving a little.  He thinks Gaviscon 

helped a little.  





Also feels like he is a little more SOB this morning.  Says he sent his son 

home because he was exerting too much energy to talk to him and feels like he 

has not caught up on his breathing yet.





Also reports thrush.  Has had it before.





- Objective


Resuscitation Status - Order Detail:





07/12/19 01:58


Resuscitation Status Routine 


   Resuscitation Status: DNAR: NO Resuscitation


   Discussed with: Patient








Vital Signs & Weight: 


 Vital Signs (12 hours)











  Temp Pulse Resp BP BP Pulse Ox


 


 07/17/19 08:22   96    


 


 07/17/19 08:16  98.1 F  96  18   185/87 H  92 L


 


 07/17/19 08:00       92 L


 


 07/17/19 03:56  99.8 F H  74  18  147/80 H   97








 Weight











Admit Weight                   138 lb


 


Weight                         136 lb 0.403 oz














I&O: 


 











 07/16/19 07/17/19 07/18/19





 06:59 06:59 06:59


 


Intake Total 1050 1640 480


 


Output Total 1050 550 220


 


Balance 0 1090 260











Result Diagrams: 


 07/17/19 04:19





 07/17/19 04:19





Phys Exam





- Physical Examination


Constitutional: NAD


A and O.  


Respiratory: no wheezing, no rales, no rhonchi


Diminished breath sounds.


Tachycardia with intermittent ectopy.


Gastrointestinal: soft, no distention, positive bowel sounds


Minimal TTP central upper abd.


Musculoskeletal: no edema


Neurological: non-focal


Psychiatric: normal affect, A&O x 3


Skin: normal turgor





Dx/Plan


(1) Acute on chronic respiratory failure with hypoxia


Code(s): J96.21 - ACUTE AND CHRONIC RESPIRATORY FAILURE WITH HYPOXIA   Status: 

Acute   Comment: significantly improved   





(2) COPD exacerbation


Code(s): J44.1 - CHRONIC OBSTRUCTIVE PULMONARY DISEASE W (ACUTE) EXACERBATION   

Status: Acute   Comment: Improving, on oxygen, oral steroids, bronchodilators, 

and oral levofloxacin   





(3) SVT (supraventricular tachycardia)


Code(s): I47.1 - SUPRAVENTRICULAR TACHYCARDIA   Status: Acute   Comment: 

continue Cardizem CD   





(4) Anemia


Code(s): D64.9 - ANEMIA, UNSPECIFIED   Status: Chronic   





(5) Bronchiectasis


Code(s): J47.9 - BRONCHIECTASIS, UNCOMPLICATED   Status: Chronic   





(6) Dyslipidemia


Code(s): E78.5 - HYPERLIPIDEMIA, UNSPECIFIED   Status: Chronic   Comment: 

continue simvastatin   





(7) History of deep venous thrombosis or pulmonary embolus


Code(s): GRV2464 -    Status: Chronic   





(8) Hypertension


Code(s): I10 - ESSENTIAL (PRIMARY) HYPERTENSION   Status: Chronic   


Qualifiers: 


 


Comment: Pt started on lisinopril   





(9) Thrush


Code(s): B37.0 - CANDIDAL STOMATITIS   Status: Acute   





(10) Abdominal pain


Code(s): R10.9 - UNSPECIFIED ABDOMINAL PAIN   Status: Acute   





- Plan





* Thrush - Diflucan SS.


* Abd pain - Possibly intestinal angina exacerbated by the tachycardia.  


 * Had PPI discontinued yesterday.  Looks like it was DC'd by Dr. Willingham.  Will 

see if there was a specific reason.


 * Seems to be getting a little better.


 * Risk for PUD with steroids.  Will ensure some form of GI protection.


* SVT - Messaged Dr. Willingham for additional thoughts.  On Multaq at the dose he 

can tolerate and PO Diltiazem.  May give additional Dilt.


* COPD - Continue Steroids, nebs, oxygen.  Pulm following.


*

## 2019-07-17 NOTE — PRG
DATE OF SERVICE:  07/17/2019



SUBJECTIVE:  Mr. Jimenez is having some recurrent tachycardia.  This is despite

diltiazem  mg a day. 



OBJECTIVE:  VITAL SIGNS:  Blood pressure 152/76; pulse 100 to 110, but sometimes he

is in the SVT rate to 150. 

LUNGS:  Clear. 

CARDIAC:  Normal S1, normal S2. 

ABDOMEN:  Soft, nontender.



ASSESSMENT:  

1. Chronic obstructive pulmonary disease.

2. Recurrent atrial tachycardias.

3. History of constipation.



PLAN:  

1. He is on diltiazem.  The dose is increased.

2. If the symptoms persist, consideration for pacemaker insertion and AV junction

ablation.  The patient is not sure if he is interested in doing this.  We will

recheck tomorrow. 







Job ID:  [id number]

## 2019-07-18 RX ADMIN — NYSTATIN SCH UNITS: 100000 SUSPENSION ORAL at 12:31

## 2019-07-18 RX ADMIN — MOMETASONE FUROATE AND FORMOTEROL FUMARATE DIHYDRATE SCH PUFF: 200; 5 AEROSOL RESPIRATORY (INHALATION) at 06:49

## 2019-07-18 RX ADMIN — NYSTATIN SCH UNITS: 100000 SUSPENSION ORAL at 09:02

## 2019-07-18 RX ADMIN — Medication SCH ML: at 09:03

## 2019-07-18 RX ADMIN — NYSTATIN SCH UNITS: 100000 SUSPENSION ORAL at 20:50

## 2019-07-18 RX ADMIN — NYSTATIN SCH UNITS: 100000 SUSPENSION ORAL at 17:41

## 2019-07-18 RX ADMIN — MOMETASONE FUROATE AND FORMOTEROL FUMARATE DIHYDRATE SCH PUFF: 200; 5 AEROSOL RESPIRATORY (INHALATION) at 19:18

## 2019-07-18 RX ADMIN — Medication SCH ML: at 20:50

## 2019-07-18 NOTE — PDOC.PN
- Subjective


Encounter Start Date: 07/18/19


Encounter Start Time: 11:45





Has worse abd pain today than he did yesterday.  Started again after eating 

breakfast.  Requesting something stronger for pain.  


His daughter was in the room and said they were interested in more aggressive 

pain management and would be interested in looking into Hospice.  I discussed 

with the patient and he concurred.





- Objective


Resuscitation Status - Order Detail:





07/12/19 01:58


Resuscitation Status Routine 


   Resuscitation Status: DNAR: NO Resuscitation


   Discussed with: Patient








Vital Signs & Weight: 


 Vital Signs (12 hours)











  Temp Pulse Resp BP BP Pulse Ox


 


 07/18/19 08:47  97.9 F  88  18   132/58 L  94 L


 


 07/18/19 07:50       93 L


 


 07/18/19 06:49   99  20   


 


 07/18/19 06:32   99  20   


 


 07/18/19 03:20  97.3 F L  82  18  115/57 L   96








 Weight











Admit Weight                   138 lb 1 oz


 


Weight                         135 lb 6.4 oz














I&O: 


 











 07/17/19 07/18/19 07/19/19





 06:59 06:59 06:59


 


Intake Total 1640 1500 


 


Output Total 550 420 


 


Balance 1090 1080 











Result Diagrams: 


 07/17/19 04:19





 07/17/19 04:19





Phys Exam





- Physical Examination


Clearly uncomfortable.  Holding abdomen.


Respiratory: no wheezing, no rales, no rhonchi


Diminished


Cardiovascular: RRR, no significant murmur


Gastrointestinal: soft, non-tender


TTP in mid-upper abdomen


Musculoskeletal: no edema





Dx/Plan


(1) Acute on chronic respiratory failure with hypoxia


Code(s): J96.21 - ACUTE AND CHRONIC RESPIRATORY FAILURE WITH HYPOXIA   Status: 

Acute   Comment: significantly improved   





(2) COPD exacerbation


Code(s): J44.1 - CHRONIC OBSTRUCTIVE PULMONARY DISEASE W (ACUTE) EXACERBATION   

Status: Acute   Comment: Improving, on oxygen, oral steroids, bronchodilators, 

and oral levofloxacin   





(3) SVT (supraventricular tachycardia)


Code(s): I47.1 - SUPRAVENTRICULAR TACHYCARDIA   Status: Acute   Comment: 

continue Cardizem CD   





(4) Anemia


Code(s): D64.9 - ANEMIA, UNSPECIFIED   Status: Chronic   





(5) Bronchiectasis


Code(s): J47.9 - BRONCHIECTASIS, UNCOMPLICATED   Status: Chronic   





(6) Dyslipidemia


Code(s): E78.5 - HYPERLIPIDEMIA, UNSPECIFIED   Status: Chronic   Comment: 

continue simvastatin   





(7) History of deep venous thrombosis or pulmonary embolus


Code(s): [id number] -    Status: Chronic   





(8) Hypertension


Code(s): I10 - ESSENTIAL (PRIMARY) HYPERTENSION   Status: Chronic   


Qualifiers: 


 


Comment: Pt started on lisinopril   





(9) Thrush


Code(s): B37.0 - CANDIDAL STOMATITIS   Status: Acute   





(10) Abdominal pain


Code(s): R10.9 - UNSPECIFIED ABDOMINAL PAIN   Status: Acute   





- Plan





* Abdominal pain appears to be related to intestinal angina consistent with his 

prior symptoms related to this.


* I will add IV MSO4 for better pain management.


* Discussed with CM.  Will get hospice consult.


* Discussed with Dr. iNño, his GI.  He was going to consult on the patient, 

but that will likely not be necessary if pursuing hospice and palliative care.

## 2019-07-18 NOTE — PRG
DATE OF SERVICE:  07/18/2019



SUBJECTIVE:  Mr. Jimenez has some abdominal pain, but his heart rate seems controlled.



OBJECTIVE:  VITAL SIGNS:  Blood pressure 132/58, pulse 88 and regular. 

LUNGS:  Clear. 

CARDIAC:  Normal S1, normal S2. 

ABDOMEN:  Soft and nontender. 

EXTREMITIES:  No edema.



ASSESSMENT:  

1. Supraventricular tachycardia, adequately controlled.

2. Severe chronic obstructive pulmonary disease.



PLAN:  Okay with me to go home.  He is taking Multaq 200 mg twice a day, Cardizem CD

180 mg a day. 







Job ID:  [id number]

## 2019-07-18 NOTE — CON
DATE OF CONSULTATION:  07/18/2019



CHIEF COMPLAINT:  Abdominal pain.



HISTORY OF PRESENT ILLNESS:  Mr. Jimenez is an 88-year-old man who was admitted a week

ago with COPD exacerbation.  He was treated with steroids and antibiotics and his

breathing is improved.  He is closer back to his baseline now.  He also had a

tachyarrhythmia during the hospitalization and his heart rates back controlled

again.  For the last 2 weeks, he has been having severe epigastric to periumbilical

aching abdominal pain that occurs starting about 20 minutes after eating.  He has

been avoiding meals because of this pain and has lost weight associated with that.

The pain is similar to the pain that he had prior to angioplasty of his mesenteric

artery.  He was having chronic similar abdominal pain and I believe around January 2018, he had balloon angioplasty to one of the abdominal vessels, whether it is the

superior mesenteric artery or celiac axis I do not know, but he was told that he

could not be stented, he can only receive balloon angioplasty.  He had marked

improvement in his symptoms after the angioplasty and has done well up until 2 weeks

ago.  He was told by the physician who performed the procedure that he could go back

and get the balloon procedure done again as he needed to.  Currently, he is

considering transition to hospice care due to his chronic lung disease and recurrent

pain and overall decline. 



PAST MEDICAL HISTORY:  Mesenteric ischemia, status post angioplasty, stroke,

prostate cancer, gastroesophageal reflux disease, hypertension, hyperlipidemia, COPD

with bronchiectasis, SVT, osteoarthritis. 



PAST SURGICAL HISTORY:  Right lung lobectomy, appendectomy, hernia repair, cataract

surgery, carpal tunnel release, EGD with esophageal dilation, angioplasty of the

mesenteric vessels, carotid endarterectomy. 



FAMILY HISTORY:  Negative for GI malignancies.



SOCIAL HISTORY:  No alcohol, tobacco, or drugs.



ALLERGIES:  MEPERIDINE, CODEINE, PENICILLIN, SULFA, TRIMETHOPRIM.



MEDICATIONS:  As an outpatient prior to admission included;

1. Prednisone.

2. Pantoprazole.

3. Albuterol.

4. Pramipexole.

5. Tramadol.

6. Multaq.

7. Eliquis.

8. Simvastatin.

9. Loratadine.

10. Guaifenesin.

11. Flunisolide.

12. Budesonide.

13. Tamsulosin.

14. Diltiazem.

15. Potassium. 

Here in the hospital he is on;

1. Levofloxacin.

2. Tramadol.

3. Tamsulosin.

4. Prednisone.

5. Pramipexole.

6. Pantoprazole.

7. Nystatin swish and swallow.

8. Inhalers.

9. Loratadine.

10. Lisinopril.

11. Multaq.

12. Diltiazem.

13. Eliquis.

14. Brovana.

15. Alprazolam.



REVIEW OF SYSTEMS:  Negative x10 systems reviewed except as stated in the history of

present illness. 



PHYSICAL EXAMINATION:

VITAL SIGNS:  Temperature 98.6, pulse 88, blood pressure 114/59. 

GENERAL:  He is in no acute distress.  He is alert and oriented x3. 

HEENT:  Eyes have no scleral icterus.  Oropharynx is clear without lesions.  No

cervical or supraclavicular lymphadenopathy. 

LUNGS:  Have diffuse bilateral inspiratory and expiratory wheezes. 

HEART:  Regular rate and rhythm. 

ABDOMEN:  Soft, nontender, and nondistended.  Bowel sounds are present. 

EXTREMITIES:  No lower extremity edema.



LABORATORY DATA:  White blood cell count 23.1, hemoglobin 12.5, platelets 133,

creatinine 0.7. 



IMPRESSION:  

1. 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

been previously treated with angioplasty with marked improvement.  The lesion

previously angioplastied could not be stented and now appears to have narrowed

again.  He has typical intestinal angina with pain after eating and avoidance of

meals due to the pain associated with weight loss.  The radiologist, Dr. Pastor Howard did indicate that he could repeat the balloon angioplasty as needed.  His

renal function is good. 

2. Advanced chronic obstructive pulmonary disease and bronchiectasis, on home oxygen

and recurrent hospitalizations. 

3. Arrhythmia.



RECOMMENDATIONS:  

1. At this point, his heart and lung conditions are stabilized and near his baseline.

2. Regarding the intestinal angina, I would encourage him to follow through with the

radiologist in [place] to repeat angioplasty to the mesenteric artery.  Even if he

chooses to go a hospice route from a pulmonary standpoint, then angioplasty still

would be a comfort measure just to keep him comfortable and pain-free after eating.

He had dramatic improvement in his symptoms after the previous angioplasty.  I

discussed this with him and his family and they will think about it 

further.  In the meantime, he can continue with a liquid diet as he tolerates and

his family stated that they could drive him [place] for that procedure.  It was done

in a single day outpatient procedure last time and hopefully this could be repeated

in a timely fashion if they chose to follow through with that. 







Job ID:  [id number]

## 2019-07-18 NOTE — PRG
DATE OF SERVICE:  07/18/2019



SUBJECTIVE:  This morning, he is better.  His abdominal pain is improved.



OBJECTIVE:  VITAL SIGNS:  His saturations are 96% on 3 L, blood pressure 102/57,

temperature 97, pulse 90, respiratory rate 20. 

CHEST:  No shortness of breath, no crackles, no wheezing. 

CARDIAC:  Atrial fibrillation. 

ABDOMEN:  Soft.



IMPRESSION:  

1. Chronic bronchitis, bronchiectasis.

2. Recurrent supraventricular tachycardia.

3. Severe deconditioning.

4. Pulmonary wise, he is stable enough to be discharged home,_when ok with

Cardiology. 



All cultures are so far negative.







Job ID:  [id number]



MTDD

## 2019-07-19 RX ADMIN — NYSTATIN SCH UNITS: 100000 SUSPENSION ORAL at 17:00

## 2019-07-19 RX ADMIN — NYSTATIN SCH UNITS: 100000 SUSPENSION ORAL at 19:57

## 2019-07-19 RX ADMIN — Medication SCH ML: at 07:45

## 2019-07-19 RX ADMIN — Medication SCH ML: at 20:04

## 2019-07-19 RX ADMIN — NYSTATIN SCH UNITS: 100000 SUSPENSION ORAL at 14:02

## 2019-07-19 RX ADMIN — MOMETASONE FUROATE AND FORMOTEROL FUMARATE DIHYDRATE SCH PUFF: 200; 5 AEROSOL RESPIRATORY (INHALATION) at 08:01

## 2019-07-19 RX ADMIN — MOMETASONE FUROATE AND FORMOTEROL FUMARATE DIHYDRATE SCH PUFF: 200; 5 AEROSOL RESPIRATORY (INHALATION) at 18:58

## 2019-07-19 RX ADMIN — NYSTATIN SCH UNITS: 100000 SUSPENSION ORAL at 09:00

## 2019-07-19 NOTE — PDOC.PALCO
Palliative Care Consult





- Consult Details


Requesting Physician: Dr Oseguera


Reason for Consult: complex decision-making





- Social History


Alcohol Use: none


Drug Use History: none


Living Situation: independent





- Allergies


Allergies/Adverse Reactions: 


 Allergies











Allergy/AdvReac Type Severity Reaction Status Date / Time


 


meperidine HCl [From Demerol] Allergy Intermediate Nausea Verified 07/12/19 14:

57


 


codeine Allergy   Verified 07/12/19 14:57


 


Penicillins Allergy  Rash Verified 07/12/19 14:57


 


sulfamethoxazole Allergy  Rash Verified 07/12/19 14:57





[From Bactrim]     


 


trimethoprim [From Bactrim] Allergy  Rash Verified 07/12/19 14:57














- Subjective





Patient awake, alert. Wears hearing aids. O2 dependent. Consult to assist with 

conversation related to complex disease processes. Patient inital visit 

requested I return to visit with his daughter. Upon next encounter with patient 

his son and daughter-in-law were present and would convey conversation to 

patient daughter. 





- Objective


Vital Signs: 


 Vital Signs - Most Recent











Temp Pulse Resp BP Pulse Ox


 


 97.8 F   84   16   120/62   94 L


 


 07/19/19 11:25  07/19/19 13:41  07/19/19 13:41  07/19/19 11:25  07/19/19 13:41











Palliative Performance Scale: 50





- Physical Exam


Constitutional: NAD


Deviation from normal: resting, remains with little intake as per patient. Pale

, fatigued


HEENT: moist MMs, EOMI


Deviation from normal: Bilateral hearing loss


Deviation from normal: diminished to bases, mildly labored with speech


Cardiovascular: RRR


Psychiatric: normal affect


Skin: normal turgor





- Problem List


(1) Palliative care encounter


Code(s): Z51.5 - ENCOUNTER FOR PALLIATIVE CARE   Current Visit: Yes   Status: 

Acute   





(2) Abdominal pain


Code(s): R10.9 - UNSPECIFIED ABDOMINAL PAIN   Current Visit: Yes   Status: 

Acute   





(3) COPD (chronic obstructive pulmonary disease)


Current Visit: No   Status: Chronic   





- Plan/Recommendations


Plan:


Family and patient asked few questions. Son asked about Hospice, what was 

provided. Discussed goal for patient to return home. Patients wife was on 

hospice in the past, but only for 3 days prior to her death. Discussed with 

patient and family that patients can be on hospice for lengthy periods of time. 

Information was given to patient and family on how to contact Palliative Care 

Team to for further questions on managing patients chronic disease processes to 

ensure goals of patient and quality of life. 





Communicated to MARYSOL Vásquez RN Palliative Care and DR Oseguera














[40] minutes spent on this encounter with >50% of the time in counseling and 

coordination of care.





Thank you for this very appropriate consult.

## 2019-07-19 NOTE — PRG
DATE OF SERVICE:  07/19/2019



SUBJECTIVE:  This morning, he is still complaining of some vague abdominal pain. 



YAQUELIN Zamarripa felt this was probably due to stenosis of his superior mesenteric artery

or celiac artery. 



Though this morning, he said it is feeling better, still weak.



OBJECTIVE:  VITAL SIGNS:  Saturations are 93% on 3 L, respirations 16, temperature

98, blood pressure 140/64. 

CHEST:  Decreased breath sounds.  No wheezing. 

CARDIAC:  Normal S1 and S2.  No gallop. 

ABDOMEN:  No mass.



ASSESSMENT:  

1. Abdominal pain, felt to be ischemic in origin, not treatable.

2. End-stage chronic obstructive pulmonary disease, bronchiectasis.

3. Supraventricular tachycardia.



PLAN:  

1. Continue present comfort care.

2. Disposition as per primary care physician.







Job ID:  [id number]

## 2019-07-19 NOTE — PDOC.PN
- Subjective


Encounter Start Date: 07/19/19


Encounter Start Time: 10:45





Abd pain is much improved.  Still present.  Did not eat.  A little afraid to do 

so.  





- Objective


Resuscitation Status - Order Detail:





07/12/19 01:58


Resuscitation Status Routine 


   Resuscitation Status: DNAR: NO Resuscitation


   Discussed with: Patient








Vital Signs & Weight: 


 Vital Signs (12 hours)











  Temp Pulse Resp BP BP Pulse Ox


 


 07/19/19 13:41   84  16    94 L


 


 07/19/19 11:25  97.8 F  105 H  20   120/62  95


 


 07/19/19 08:01       93 L


 


 07/19/19 08:00   82  16    93 L


 


 07/19/19 07:45       93 L


 


 07/19/19 07:39  98.7 F  97  18  145/64 H   93 L


 


 07/19/19 04:24   87  18  134/62   93 L








 Weight











Admit Weight                   138 lb 1 oz


 


Weight                         138 lb 14.259 oz














I&O: 


 











 07/18/19 07/19/19 07/20/19





 06:59 06:59 06:59


 


Intake Total 1500 240 


 


Output Total 420 350 


 


Balance 1080 -110 











Result Diagrams: 


 07/17/19 04:19





 07/17/19 04:19





Phys Exam





- Physical Examination


Constitutional: NAD


Respiratory: no wheezing, no rales


Diminished.


Cardiovascular: RRR, no significant murmur


Gastrointestinal: soft, non-tender, no distention


Musculoskeletal: no edema


Psychiatric: normal affect, A&O x 3


Skin: normal turgor





Dx/Plan


(1) Acute on chronic respiratory failure with hypoxia


Code(s): J96.21 - ACUTE AND CHRONIC RESPIRATORY FAILURE WITH HYPOXIA   Status: 

Acute   Comment: significantly improved   





(2) COPD exacerbation


Code(s): J44.1 - CHRONIC OBSTRUCTIVE PULMONARY DISEASE W (ACUTE) EXACERBATION   

Status: Acute   Comment: Improving, on oxygen, oral steroids, bronchodilators, 

and oral levofloxacin   





(3) SVT (supraventricular tachycardia)


Code(s): I47.1 - SUPRAVENTRICULAR TACHYCARDIA   Status: Acute   Comment: 

continue Cardizem CD   





(4) Anemia


Code(s): D64.9 - ANEMIA, UNSPECIFIED   Status: Chronic   





(5) Bronchiectasis


Code(s): J47.9 - BRONCHIECTASIS, UNCOMPLICATED   Status: Chronic   





(6) Dyslipidemia


Code(s): E78.5 - HYPERLIPIDEMIA, UNSPECIFIED   Status: Chronic   Comment: 

continue simvastatin   





(7) History of deep venous thrombosis or pulmonary embolus


Code(s): LAJ4002 -    Status: Chronic   





(8) Hypertension


Code(s): I10 - ESSENTIAL (PRIMARY) HYPERTENSION   Status: Chronic   


Qualifiers: 


 


Comment: Pt started on lisinopril   





(9) Thrush


Code(s): B37.0 - CANDIDAL STOMATITIS   Status: Acute   





(10) Abdominal pain


Code(s): R10.9 - UNSPECIFIED ABDOMINAL PAIN   Status: Acute   Comment: 

Intestinal angina   





- Plan





* Stable from the cardiac and pulm standpoint.


* Abdominal pain persists.


* Discussed with GI yesterday.  Possibility he could get angioplasty again.


* He is not sure he wants to pursue that because he is not sure his lungs are 

up to the challenge.


* His daughter will be in later and he will discuss options.


* Discussed with Palliative Care NP and she will help facilitate their 

conversation as needed.


* He lives alone and cannot likely go home with this degree of abdominal pain.

## 2019-07-19 NOTE — PDOC.EVN
Event Note





- Event Note


Event Note: 





Talked with the patient's daughter.  They have talked and decided to pursue the 

potential angioplasty.  She has contacted Dr. Howard who did the prior 

angioplasty.  He would be willing to try again.  I have called his office at [phone].  Left a message, then called again.  Spoke with the  at his 

practice.  She ultimately sent him an email in hopes that he would call me 

back.  If he accepts, will likely need to find a hospitalist at [facility] to accept.  Will go through the transfer center at that point.

## 2019-07-19 NOTE — PRG
DATE OF SERVICE:  07/19/2019



SUBJECTIVE:  Mr. Jimenez still gets pain following eating in epigastric region.  His

cardiopulmonary symptoms have improved. 



OBJECTIVE:  GENERAL:  He is resting comfortably. 

ABDOMEN:  No significant tenderness to palpation. 

VITAL SIGNS:  Temperature is 97.8, pulse 105, and blood pressure 120/62.



IMPRESSION:  Intestinal angina/ischemia.  The symptoms have flared back up again

over the last couple of weeks.  I would encourage him to follow up with

Interventional Radiology in [place] to repeat angioplasty to his mesenteric artery.

He had marked clinical improvement with the procedure previously.  I would

anticipate discharge home today.  He was also encouraged to take supplements such as

Ensure or Boost to try to maximize his nutritional intake in the meantime. 







Job ID:  [id number]

## 2019-07-20 RX ADMIN — Medication SCH ML: at 20:17

## 2019-07-20 RX ADMIN — MOMETASONE FUROATE AND FORMOTEROL FUMARATE DIHYDRATE SCH PUFF: 200; 5 AEROSOL RESPIRATORY (INHALATION) at 18:51

## 2019-07-20 RX ADMIN — NYSTATIN SCH UNITS: 100000 SUSPENSION ORAL at 13:04

## 2019-07-20 RX ADMIN — Medication SCH ML: at 08:31

## 2019-07-20 RX ADMIN — MOMETASONE FUROATE AND FORMOTEROL FUMARATE DIHYDRATE SCH PUFF: 200; 5 AEROSOL RESPIRATORY (INHALATION) at 07:02

## 2019-07-20 RX ADMIN — NYSTATIN SCH UNITS: 100000 SUSPENSION ORAL at 08:25

## 2019-07-20 NOTE — PDOC.PN
- Subjective


Encounter Start Date: 07/20/19


Encounter Start Time: 09:00





Still having some pain in the abdomen.  Waited too long to ask for meds last 

night and then took some time to get the meds.  Pain became for severe and he 

vomited once.  Better this morning, but still present.  No BM for a couple of 

days.  Has had medicine for it today.





- Objective


Resuscitation Status - Order Detail:





07/12/19 01:58


Resuscitation Status Routine 


   Resuscitation Status: DNAR: NO Resuscitation


   Discussed with: Patient








Vital Signs & Weight: 


 Vital Signs (12 hours)











  Temp Pulse Pulse Pulse Resp BP BP


 


 07/20/19 14:07   82    16  


 


 07/20/19 11:30  98.0 F  95    16  


 


 07/20/19 11:06    102 H  98   134/60  141/62 H


 


 07/20/19 08:15  97.7 F  102 H    18  


 


 07/20/19 07:01   91    16  














  BP Pulse Ox Pulse Ox Pulse Ox


 


 07/20/19 14:07   94 L  


 


 07/20/19 11:30  135/67  92 L  


 


 07/20/19 11:06    84 L  90 L


 


 07/20/19 08:15  136/63  93 L  


 


 07/20/19 07:01   95  








 Weight











Admit Weight                   138 lb 1 oz


 


Weight                         140 lb 3.424 oz














I&O: 


 











 07/19/19 07/20/19 07/21/19





 06:59 06:59 06:59


 


Intake Total 240  


 


Output Total 350  


 


Balance -110  











Result Diagrams: 


 07/17/19 04:19





 07/17/19 04:19





Phys Exam





- Physical Examination


Constitutional: NAD


Respiratory: no wheezing, no rales


Diminished 


Cardiovascular: RRR


Gastrointestinal: soft


Slightly distended.  Hyper-resonant.


Musculoskeletal: no edema


Psychiatric: normal affect, A&O x 3


Deviation from normal: Slightly groggy.





Dx/Plan


(1) Acute on chronic respiratory failure with hypoxia


Code(s): J96.21 - ACUTE AND CHRONIC RESPIRATORY FAILURE WITH HYPOXIA   Status: 

Acute   





(2) COPD exacerbation


Code(s): J44.1 - CHRONIC OBSTRUCTIVE PULMONARY DISEASE W (ACUTE) EXACERBATION   

Status: Acute   





(3) SVT (supraventricular tachycardia)


Code(s): I47.1 - SUPRAVENTRICULAR TACHYCARDIA   Status: Acute   





(4) Anemia


Code(s): D64.9 - ANEMIA, UNSPECIFIED   Status: Chronic   





(5) Bronchiectasis


Code(s): J47.9 - BRONCHIECTASIS, UNCOMPLICATED   Status: Chronic   





(6) Dyslipidemia


Code(s): E78.5 - HYPERLIPIDEMIA, UNSPECIFIED   Status: Chronic   Comment: 

continue simvastatin   





(7) History of deep venous thrombosis or pulmonary embolus


Code(s): VRV7122 -    Status: Chronic   





(8) Hypertension


Code(s): I10 - ESSENTIAL (PRIMARY) HYPERTENSION   Status: Chronic   


Qualifiers: 


 


Comment: Pt started on lisinopril   





(9) Thrush


Code(s): B37.0 - CANDIDAL STOMATITIS   Status: Acute   





(10) Abdominal pain


Code(s): R10.9 - UNSPECIFIED ABDOMINAL PAIN   Status: Acute   Comment: 

Intestinal angina   





- Plan





* Continue PRN pain meds, including IV morphine.


* KUB to ensure there is no obstruction.


* Decrease Prednisone.


* Stopy nystatin.


* Dulcolax.


* Explained that the IR Dr. Howard would not likely be the accepting doc at 

[facility].  Would likely be hospitalist.  They won't accept without IR 

approval and that probably won't happen before Monday.

## 2019-07-20 NOTE — PRG
DATE OF SERVICE:  07/20/2019



INTERVAL HISTORY:  The patient is doing fine from respiratory standpoint.  He is

breathing comfortably.  He is down to 3 L nasal cannula, which is his home 
dose.  He

did not have any fevers or chills overnight.  He did not wake up gasping, 
choking,

or coughing.  Otherwise, he feels like his breathing is essentially back to

baseline.  His biggest issue right now is his belly.  Apparently, he is waiting 
to

go to [place], possibly as early as Monday. 



PHYSICAL EXAMINATION:

VITAL SIGNS:  Afebrile, pulse 101, blood pressure 122/66, respirations 18,

saturation 92% on 3 L nasal cannula. 

GENERAL:  The patient is awake and alert, in no apparent distress. 

LUNGS:  Decent air entry.  Crackles are present dependently.  There are some 
rhonchi

present.  I do not appreciate any wheezing. 

HEART:  Normal rate and regular. 

ABDOMEN:  Soft.  Distended.  Bowel sounds present.  No rebound or guarding is

present. 

MUSCULOSKELETAL:  No cyanosis or clubbing.  The pitting edema is resolved. 

:  No Vitale. 

NEUROLOGIC:  Grossly nonfocal.



LABORATORY DATA:  WBC 23.1, hemoglobin 12.5, platelets 133,000.  Band count is 
11%

on top of 83% neutrophils.  D-dimer is 0.67.  Bicarb 32, chloride 97, sodium 
135.

Basic metabolic profile is otherwise unremarkable.  Respiratory culture is 
negative

to date. 



IMAGING DATA:  Abdominal x-ray demonstrates nonobstructive bowel gas pattern.



ASSESSMENT:  

1. Acute on chronic hypoxic respiratory failure. 

2. Chronic obstructive pulmonary disease with acute exacerbation.

3. Bronchiectasis.

4. Intestinal angina.



DISCUSSION AND PLAN:  We will continue supportive care for his COPD.

Pulmonary/Critical Care will continue to follow along while the patient remains

inhouse. 







Job ID:  [id number]



MTDD

## 2019-07-20 NOTE — RAD
EXAM: Single view of the abdomen



HISTORY: Abdominal pain and distention



COMPARISON: None



FINDINGS: Single view of the abdomen shows a nonspecific, nonobstructive bowel gas pattern. Air-fille
d loops of small bowel and colon are seen. Air and stool is seen in the rectum.   No suspicious

calcifications are seen.   Degenerative changes and scoliotic curvature of the spine are seen. Vascul
ar calcifications are seen.



IMPRESSION: Unremarkable exam



Reported By: Cali Bedoya 

Electronically Signed:  7/20/2019 12:47 PM

## 2019-07-21 RX ADMIN — MOMETASONE FUROATE AND FORMOTEROL FUMARATE DIHYDRATE SCH PUFF: 200; 5 AEROSOL RESPIRATORY (INHALATION) at 06:55

## 2019-07-21 RX ADMIN — Medication PRN ML: at 14:20

## 2019-07-21 RX ADMIN — MOMETASONE FUROATE AND FORMOTEROL FUMARATE DIHYDRATE SCH PUFF: 200; 5 AEROSOL RESPIRATORY (INHALATION) at 18:39

## 2019-07-21 RX ADMIN — Medication SCH ML: at 20:11

## 2019-07-21 RX ADMIN — DEXTROSE AND SODIUM CHLORIDE SCH MLS: 5; 450 INJECTION, SOLUTION INTRAVENOUS at 17:43

## 2019-07-21 RX ADMIN — Medication SCH ML: at 08:58

## 2019-07-21 NOTE — PDOC.PN
- Subjective


Encounter Start Date: 07/21/19


Encounter Start Time: 12:15





Continues to have abdominal pain.  Now constant and continues to require IV 

meds. Not eating much at all now. 





- Objective


Resuscitation Status - Order Detail:





07/12/19 01:58


Resuscitation Status Routine 


   Resuscitation Status: DNAR: NO Resuscitation


   Discussed with: Patient








Vital Signs & Weight: 


 Vital Signs (12 hours)











  Temp Pulse Resp BP BP Pulse Ox


 


 07/21/19 13:44   100  16   


 


 07/21/19 11:42  98.3 F  88  18  108/59 L   94 L


 


 07/21/19 08:05  98.3 F  103 H  18  127/61   94 L


 


 07/21/19 07:15       94 L


 


 07/21/19 06:56   91  14   


 


 07/21/19 04:00  97.9 F  100  20   130/57 L  92 L








 Weight











Admit Weight                   138 lb 1 oz


 


Weight                         135 lb 12.876 oz














I&O: 


 











 07/20/19 07/21/19 07/22/19





 06:59 06:59 06:59


 


Intake Total  491 


 


Output Total  450 


 


Balance  41 











Result Diagrams: 


 07/17/19 04:19





 07/17/19 04:19





Phys Exam





- Physical Examination


Somnolent but easily awakened. 


Respiratory: no wheezing, no rales


Cardiovascular: RRR, no significant murmur


Gastrointestinal: soft


Distended, but soft. Bowel sounds present.  Diffusely, moderately tender.


Musculoskeletal: no edema


Psychiatric: normal affect, A&O x 3





Dx/Plan


(1) Acute on chronic respiratory failure with hypoxia


Code(s): J96.21 - ACUTE AND CHRONIC RESPIRATORY FAILURE WITH HYPOXIA   Status: 

Acute   





(2) COPD exacerbation


Code(s): J44.1 - CHRONIC OBSTRUCTIVE PULMONARY DISEASE W (ACUTE) EXACERBATION   

Status: Acute   





(3) SVT (supraventricular tachycardia)


Code(s): I47.1 - SUPRAVENTRICULAR TACHYCARDIA   Status: Acute   





(4) Anemia


Code(s): D64.9 - ANEMIA, UNSPECIFIED   Status: Chronic   





(5) Bronchiectasis


Code(s): J47.9 - BRONCHIECTASIS, UNCOMPLICATED   Status: Chronic   





(6) Dyslipidemia


Code(s): E78.5 - HYPERLIPIDEMIA, UNSPECIFIED   Status: Chronic   Comment: 

continue simvastatin   





(7) History of deep venous thrombosis or pulmonary embolus


Code(s): ROS9176 -    Status: Chronic   





(8) Hypertension


Code(s): I10 - ESSENTIAL (PRIMARY) HYPERTENSION   Status: Chronic   


Qualifiers: 


 


Comment: Pt started on lisinopril   





(9) Thrush


Code(s): B37.0 - CANDIDAL STOMATITIS   Status: Acute   





(10) Abdominal pain


Code(s): R10.9 - UNSPECIFIED ABDOMINAL PAIN   Status: Acute   Comment: 

Intestinal angina   





- Plan





* Discussed with Dr. Niño.


* XR was negative for ileus or obstruction.


* He will try to place NGT and see if he can decompress his abdomen. If not, he 

will remove it.


* He will also check for impaction.


* I have spoken with the patient and his son. Patient would like to ensure that 

he is going to transfer to Dr. Howard.


* I have worked through the transfer center and spoken with Dr. Rojas at [facility]. 

She is going to try to contact Dr. Howard and see when he would like to get 

the patient transferred for possible angioplasty.


* Holding the Eliquis. (hx of dvt)


* Continue pain management.


* Stable from the standpoint of the SVT and COPD.


* Will start some IVF.


* Continue to wean the steroids started for the COPD.


* Still on Levaquin as part of the treatment for the COPD exac, but that can 

likely be stopped tomorrow.

## 2019-07-21 NOTE — PRG
DATE OF SERVICE:  07/21/2019



SERVICE:  Pulmonary Medicine.



INTERVAL HISTORY:  The patient is doing okay from respiratory standpoint.  When he

gets up, he still has dyspnea that slows him down.  That being said, once again his

biggest complaint is more about his abdomen.  He denies any current fevers or

chills.  There were no significant overnight events otherwise.  Anytime he eats any

food, he has some discomfort, and starts belching significantly. 



PHYSICAL EXAMINATION:

VITAL SIGNS:  Afebrile, pulse 88, blood pressure 108/59, respirations 18, saturation

94% on 3 L nasal cannula. 

GENERAL:  The patient is awake and alert, in no apparent distress. 

LUNGS:  Decent air entry.  There is a prolonged expiratory phase.  I hear some

wheezing.  No rhonchi or crackles are appreciated. 

HEART:  Normal rate and regular. 

ABDOMEN:  Distended.  Bowel sounds are active.  There is some mild tenderness to

palpation without rebound or guarding. 

:  No Vitale. 

NEUROLOGIC:  Nonfocal. 

MUSCULOSKELETAL:  No cyanosis or clubbing.  There is no pitting edema.



LABORATORY DATA:  WBC 23.1, hemoglobin 12.5, platelets 133,000.  Band count is

increasing to 11% on top of 83% neutrophils.  Bicarb 32.  Basic metabolic profile is

otherwise unremarkable. 



ASSESSMENT:  

1. Chronic obstructive pulmonary disease with acute exacerbation.

2. Bronchiectasis with exacerbation.

3. Intestinal angina.



DISCUSSION AND PLAN:  Agree with gentle hydration.  Pulmonary/Critical Care will

continue to follow along, intermittently while the patient remains inhouse.  Dr. Marr will assume care in the morning.  From a purely respiratory standpoint, he can

go home, though we are awaiting final disposition from GI regarding his

intermittently ischemic gut. 







Job ID:  [id number]

## 2019-07-22 LAB
ANION GAP SERPL CALC-SCNC: 12 MMOL/L (ref 10–20)
BASOPHILS # BLD AUTO: 0 THOU/UL (ref 0–0.2)
BASOPHILS NFR BLD AUTO: 0 % (ref 0–1)
BUN SERPL-MCNC: 21 MG/DL (ref 8.4–25.7)
CALCIUM SERPL-MCNC: 8.8 MG/DL (ref 7.8–10.44)
CHLORIDE SERPL-SCNC: 96 MMOL/L (ref 98–107)
CO2 SERPL-SCNC: 30 MMOL/L (ref 23–31)
CREAT CL PREDICTED SERPL C-G-VRATE: 64 ML/MIN (ref 70–130)
EOSINOPHIL # BLD AUTO: 0 THOU/UL (ref 0–0.7)
EOSINOPHIL NFR BLD AUTO: 0.1 % (ref 0–10)
GLUCOSE SERPL-MCNC: 111 MG/DL (ref 83–110)
HGB BLD-MCNC: 11.2 G/DL (ref 14–18)
LYMPHOCYTES # BLD: 0.4 THOU/UL (ref 1.2–3.4)
LYMPHOCYTES NFR BLD AUTO: 1.5 % (ref 21–51)
MCH RBC QN AUTO: 35.7 PG (ref 27–31)
MCV RBC AUTO: 113 FL (ref 78–98)
MONOCYTES # BLD AUTO: 3 THOU/UL (ref 0.11–0.59)
MONOCYTES NFR BLD AUTO: 12.4 % (ref 0–10)
NEUTROPHILS # BLD AUTO: 20.4 THOU/UL (ref 1.4–6.5)
NEUTROPHILS NFR BLD AUTO: 86 % (ref 42–75)
PLATELET # BLD AUTO: 166 THOU/UL (ref 130–400)
POTASSIUM SERPL-SCNC: 4.3 MMOL/L (ref 3.5–5.1)
RBC # BLD AUTO: 3.15 MILL/UL (ref 4.7–6.1)
SODIUM SERPL-SCNC: 134 MMOL/L (ref 136–145)
WBC # BLD AUTO: 23.7 THOU/UL (ref 4.8–10.8)

## 2019-07-22 RX ADMIN — Medication SCH ML: at 08:43

## 2019-07-22 RX ADMIN — DEXTROSE AND SODIUM CHLORIDE SCH: 5; 450 INJECTION, SOLUTION INTRAVENOUS at 13:53

## 2019-07-22 RX ADMIN — Medication SCH ML: at 20:31

## 2019-07-22 RX ADMIN — MOMETASONE FUROATE AND FORMOTEROL FUMARATE DIHYDRATE SCH PUFF: 200; 5 AEROSOL RESPIRATORY (INHALATION) at 06:24

## 2019-07-22 RX ADMIN — MOMETASONE FUROATE AND FORMOTEROL FUMARATE DIHYDRATE SCH PUFF: 200; 5 AEROSOL RESPIRATORY (INHALATION) at 18:26

## 2019-07-22 RX ADMIN — DEXTROSE AND SODIUM CHLORIDE SCH: 5; 450 INJECTION, SOLUTION INTRAVENOUS at 08:52

## 2019-07-22 RX ADMIN — DEXTROSE AND SODIUM CHLORIDE SCH: 5; 450 INJECTION, SOLUTION INTRAVENOUS at 22:29

## 2019-07-22 RX ADMIN — Medication PRN ML: at 23:07

## 2019-07-22 NOTE — PDOC.PN
- Subjective


Encounter Start Date: 07/22/19


Encounter Start Time: 11:04


Subjective: :not too good"





- Objective


Resuscitation Status - Order Detail:





07/12/19 01:58


Resuscitation Status Routine 


   Resuscitation Status: DNAR: NO Resuscitation


   Discussed with: Patient








MAR Reviewed: Yes


Vital Signs & Weight: 


 Vital Signs (12 hours)











  Temp Pulse Resp BP BP BP Pulse Ox


 


 07/22/19 08:41     117/58 L   


 


 07/22/19 08:00        88 L


 


 07/22/19 07:46  98.0 F  101 H  20    117/58 L  88 L


 


 07/22/19 06:25   93  16     93 L


 


 07/22/19 06:24   94  16     93 L


 


 07/22/19 06:22   94  16     93 L


 


 07/22/19 04:00  98 F  91  20   117/58 L   92 L








 Weight











Admit Weight                   138 lb 1 oz


 


Weight                         135 lb 6.4 oz














I&O: 


 











 07/21/19 07/22/19 07/23/19





 06:59 06:59 06:59


 


Intake Total 491 360 100


 


Output Total 450 480 


 


Balance 41 -120 100











Result Diagrams: 


 07/22/19 02:14





 07/22/19 02:14





Phys Exam





- Physical Examination


Neck: no JVD


Respiratory: clear to auscultation bilateral


Cardiovascular: RRR, no significant murmur


Gastrointestinal: soft, positive bowel sounds


Musculoskeletal: no edema





Dx/Plan


(1) Abdominal angina


Code(s): K55.1 - CHRONIC VASCULAR DISORDERS OF INTESTINE   Status: Acute   





(2) Abdominal pain


Code(s): R10.9 - UNSPECIFIED ABDOMINAL PAIN   Status: Acute   


Qualifiers: 


   Abdominal location: generalized   Qualified Code(s): R10.84 - Generalized 

abdominal pain   


Comment: Intestinal angina   





(3) Acute on chronic respiratory failure with hypoxia


Code(s): J96.21 - ACUTE AND CHRONIC RESPIRATORY FAILURE WITH HYPOXIA   Status: 

Acute   Comment: significantly improved   





(4) COPD exacerbation


Code(s): J44.1 - CHRONIC OBSTRUCTIVE PULMONARY DISEASE W (ACUTE) EXACERBATION   

Status: Acute   Comment: Improving, on oxygen, oral steroids, bronchodilators, 

and oral levofloxacin   





(5) Bronchiectasis


Code(s): J47.9 - BRONCHIECTASIS, UNCOMPLICATED   Status: Chronic   





(6) Dyslipidemia


Code(s): E78.5 - HYPERLIPIDEMIA, UNSPECIFIED   Status: Chronic   Comment: 

continue simvastatin   





(7) Hypertension


Code(s): I10 - ESSENTIAL (PRIMARY) HYPERTENSION   Status: Chronic   


Qualifiers: 


   Hypertension type: essential hypertension 


Comment: Pt started on lisinopril   





- Plan


transfer to [facility] for rpt abd angioplasty in progress


-: cont tx for COPD, etc





* .

## 2019-07-22 NOTE — PDOC.EVN
Event Note





- Event Note


Event Note: 





[facility] consult

## 2019-07-22 NOTE — RAD
Chest one view



HISTORY: Productive cough.



COMPARISON: 7/11/2019.



FINDINGS: Cardiac silhouette is magnified and upper limits of normal in size. Pulmonary vasculature i
s unremarkable. Mediastinum is midline with aortic calcification.



Calcification over the right apical pleura is similar in appearance and position. Coarse interstitial
 markings and calcified granulomata are unchanged in appearance.







IMPRESSION: Fibrotic changes and other chronic-type findings are stable.



Atherosclerosis.



Reported By: GUANAKO Buchanan 

Electronically Signed:  7/22/2019 7:47 AM

## 2019-07-22 NOTE — PRG
DATE OF SERVICE:  07/22/2019



SUBJECTIVE:  This morning, he is weak.  He is still having abdominal pain.



OBJECTIVE:  VITAL SIGNS:  His saturations are 93% on 3 L, pulse 101, temperature 98,

blood pressure 117/58. 

GENERAL:  He told me coughed a little bit of blood yesterday, minimal. 

CHEST:  Reveals decreased breath sounds.  Bilateral rhonchi, crackles. 

CARDIAC:  Normal S1, S2.  No gallops. 

ABDOMEN:  No masses.



LABORATORY DATA:  His white count is still elevated at 23,000.  Lytes are normal.



IMAGING STUDIES:  X-ray shows stable and bilateral chronic infiltrates.



IMPRESSION AND PLAN:  

1. Chronic bronchitis, bronchiectasis, hemoptysis secondary to that.

2. Chronic respiratory failure.  Secondary to #1.

3. Abdominal pain.  Ischemic bowel.  Gastrointestinal is considered transferring him

to [place] for a possible intervention, for angioplasty of his mesenteric artery.

If this is unsuccessful, the patient told me he wants to get hospice care.  He is

concerned about his chronic pain and poor lifestyle.  Pulmonary wise, continue low

dose prednisone.  Supportive care, PT. 







Job ID:  [id number]

## 2019-07-23 VITALS — SYSTOLIC BLOOD PRESSURE: 106 MMHG | TEMPERATURE: 98.6 F | DIASTOLIC BLOOD PRESSURE: 57 MMHG

## 2019-07-23 RX ADMIN — Medication SCH ML: at 07:40

## 2019-07-23 RX ADMIN — MOMETASONE FUROATE AND FORMOTEROL FUMARATE DIHYDRATE SCH PUFF: 200; 5 AEROSOL RESPIRATORY (INHALATION) at 06:32

## 2019-07-23 RX ADMIN — DEXTROSE AND SODIUM CHLORIDE SCH: 5; 450 INJECTION, SOLUTION INTRAVENOUS at 08:59

## 2019-07-23 NOTE — DIS
DATE OF ADMISSION:  07/11/2019



DATE OF DISCHARGE:  07/23/2019



PRIMARY CARE PROVIDER:  Deandre Flowers MD



DISPOSITION:  Discharged home.



FINAL DIAGNOSES:  Abdominal angina, chronic obstructive pulmonary disease with acute

exacerbation, acute respiratory failure with hypoxemia, dyslipidemia,

bronchiectasis, hypertension, atrial fibrillation, and chronic anticoagulation, 



DISCHARGE MEDICATIONS:  

1. DuoNeb 3 mL t.i.d.

2. Albuterol HFA 2 puffs q.4 hours p.r.n.

3. Tramadol 50 mg p.o. t.i.d. p.r.n.

4. Multaq 200 mg p.o. b.i.d.

5. Symbicort 160/4.5 two puffs inhaled b.i.d.

6. Eliquis 5 mg p.o. b.i.d.

7. Lasix 20 mg p.o. b.i.d. p.r.n.

8. Protonix 40 mg a day.

9. Zocor 10 mg in the evening.

10. Mirapex 1.5 mg p.o. q.i.d.

11. Flomax 0.4 mg two tabs at bedtime.

12. Prednisone 5 mg p.o. daily p.r.n.

13. Potassium chloride 20 mEq p.o. daily.



ALLERGIES:  MEPERIDINE, CODEINE, PENICILLINS, SULFAMETHOXAZOLE, AND TRIMETHOPRIM.



DIET:  As tolerated.



PENDING AT TIME OF DISCHARGE:  Nothing.



CODE STATUS:  Do not resuscitate.



HOSPITAL COURSE:  The patient admitted to [facility] through [facility] with shortness of breath and cough.  He has a history of

bronchiectasis and COPD.  He was put on aggressive therapy was IV antibiotics, IV

steroids, discontinued on O2.  Initial chest x-ray revealed chronic obstructive

pulmonary disease with scarring, mild right pleural effusion, no infiltrates, no

CHF. 



His laboratory comprehensive metabolic profile showed a chloride of 95 and a CO2 of

34, consistent with compensated respiratory failure.  Blood sugar was 115.  Renal

function was normal.  Liver function was normal.  CBC had a mild elevated white

count 11.7 consistent with steroids he was on, hemoglobin 12.0, and platelet count

195,000.  He was seen in consultation by Dr. Francisco Niño, Gastroenterology; Dr. Brian Marr, Pulmonology and Dr. Amilcar Willingham, Cardiology.  The patient had abdominal

pain, had had a previous balloon angioplasty for abdominal angina.  His symptoms had

improved until approximately two weeks ago.  The patient improved with his therapy

for his pulmonary disease; however, we continued to be unable to do much without

becoming severely short of breath.  His gastroenterologist in [place] was consulted.

 Arrangements will be made for the patient to go to [place] for repeat angioplasty.

When on 07/22/2019, he changed his mind, he decided, he did not wish to undergo any

therapy, he wished to have hospice.  Family was in agreement.  Hospice was

consulted.  He is going home today under hospice care.  Medications for his current

diseases have been confirmed.  However, medications are subject to change with

hospice. 







Job ID:  [id number]

## 2019-07-23 NOTE — PRG
DATE OF SERVICE:  07/23/2019



SUBJECTIVE HISTORY:  This morning, he is awake, alert, and responsive.  He is still

having abdominal pain. 



OBJECTIVE:  VITAL SIGNS:  His saturations are 91% on 2 L, respirations 16,

temperature 97, pulse 87, and blood pressure 114/56. 

CHEST:  Bilateral rhonchi and crackles. 

CARDIAC:  Normal S1 and S2.  No gallops. 

ABDOMEN:  No masses.



IMPRESSION:  

1. Abdominal ischemia.  Not going to [place] for any additional workup.

2. End-stage lung disease.



PLAN:  He is going to go home with hospice.  Palliative care.







Job ID:  [id number]